# Patient Record
Sex: FEMALE | Race: WHITE | Employment: OTHER | URBAN - METROPOLITAN AREA
[De-identification: names, ages, dates, MRNs, and addresses within clinical notes are randomized per-mention and may not be internally consistent; named-entity substitution may affect disease eponyms.]

---

## 2017-12-01 ENCOUNTER — TRANSFERRED RECORDS (OUTPATIENT)
Dept: HEALTH INFORMATION MANAGEMENT | Facility: CLINIC | Age: 67
End: 2017-12-01

## 2017-12-20 ENCOUNTER — TRANSFERRED RECORDS (OUTPATIENT)
Dept: HEALTH INFORMATION MANAGEMENT | Facility: CLINIC | Age: 67
End: 2017-12-20

## 2017-12-27 ENCOUNTER — TRANSFERRED RECORDS (OUTPATIENT)
Dept: HEALTH INFORMATION MANAGEMENT | Facility: CLINIC | Age: 67
End: 2017-12-27

## 2017-12-28 ENCOUNTER — TRANSFERRED RECORDS (OUTPATIENT)
Dept: HEALTH INFORMATION MANAGEMENT | Facility: CLINIC | Age: 67
End: 2017-12-28

## 2018-01-26 ENCOUNTER — TRANSFERRED RECORDS (OUTPATIENT)
Dept: HEALTH INFORMATION MANAGEMENT | Facility: CLINIC | Age: 68
End: 2018-01-26

## 2018-02-20 ENCOUNTER — TELEPHONE (OUTPATIENT)
Dept: NEUROLOGY | Facility: CLINIC | Age: 68
End: 2018-02-20

## 2018-02-20 NOTE — TELEPHONE ENCOUNTER
Returned call to patient who was requesting ALS Clinic appointment. Patient received dx from [facility] in Dec 2017/Jan 2018. Patient will have [facility] send records and will be contacted to schedule once reviewed.

## 2018-04-05 ENCOUNTER — THERAPY VISIT (OUTPATIENT)
Dept: SPEECH THERAPY | Facility: CLINIC | Age: 68
End: 2018-04-05
Payer: MEDICARE

## 2018-04-05 ENCOUNTER — OFFICE VISIT (OUTPATIENT)
Dept: NEUROLOGY | Facility: CLINIC | Age: 68
End: 2018-04-05
Payer: MEDICARE

## 2018-04-05 ENCOUNTER — RESULTS ONLY (OUTPATIENT)
Dept: LAB | Age: 68
End: 2018-04-05

## 2018-04-05 ENCOUNTER — ALLIED HEALTH/NURSE VISIT (OUTPATIENT)
Dept: NEUROLOGY | Facility: CLINIC | Age: 68
End: 2018-04-05

## 2018-04-05 ENCOUNTER — ALLIED HEALTH/NURSE VISIT (OUTPATIENT)
Dept: NUTRITION | Facility: CLINIC | Age: 68
End: 2018-04-05

## 2018-04-05 ENCOUNTER — THERAPY VISIT (OUTPATIENT)
Dept: OCCUPATIONAL THERAPY | Facility: CLINIC | Age: 68
End: 2018-04-05
Payer: MEDICARE

## 2018-04-05 VITALS
BODY MASS INDEX: 20.28 KG/M2 | SYSTOLIC BLOOD PRESSURE: 159 MMHG | HEIGHT: 61 IN | WEIGHT: 107.4 LBS | DIASTOLIC BLOOD PRESSURE: 79 MMHG | HEART RATE: 80 BPM

## 2018-04-05 DIAGNOSIS — G12.21 ALS (AMYOTROPHIC LATERAL SCLEROSIS) (H): Primary | ICD-10-CM

## 2018-04-05 DIAGNOSIS — G12.21 ALS (AMYOTROPHIC LATERAL SCLEROSIS) (H): ICD-10-CM

## 2018-04-05 DIAGNOSIS — K11.7 DISTURBANCE OF SALIVARY SECRETION: ICD-10-CM

## 2018-04-05 DIAGNOSIS — Z78.9 DECREASED ACTIVITIES OF DAILY LIVING (ADL): ICD-10-CM

## 2018-04-05 DIAGNOSIS — R13.12 OROPHARYNGEAL DYSPHAGIA: Primary | ICD-10-CM

## 2018-04-05 DIAGNOSIS — Z71.9 VISIT FOR COUNSELING: Primary | ICD-10-CM

## 2018-04-05 DIAGNOSIS — R13.12 OROPHARYNGEAL DYSPHAGIA: ICD-10-CM

## 2018-04-05 LAB
ALBUMIN SERPL-MCNC: 4.1 G/DL (ref 3.4–5)
ALP SERPL-CCNC: 53 U/L (ref 40–150)
ALT SERPL W P-5'-P-CCNC: 16 U/L (ref 0–50)
AST SERPL W P-5'-P-CCNC: 22 U/L (ref 0–45)
BILIRUB DIRECT SERPL-MCNC: <0.1 MG/DL (ref 0–0.2)
BILIRUB SERPL-MCNC: 0.3 MG/DL (ref 0.2–1.3)
CK SERPL-CCNC: 251 U/L (ref 30–225)
CREAT SERPL-MCNC: 0.73 MG/DL (ref 0.52–1.04)
GFR SERPL CREATININE-BSD FRML MDRD: 80 ML/MIN/1.7M2
PROT SERPL-MCNC: 7.6 G/DL (ref 6.8–8.8)
URATE SERPL-MCNC: 4.5 MG/DL (ref 2.6–6)

## 2018-04-05 RX ORDER — TRAMADOL HYDROCHLORIDE 50 MG/1
TABLET ORAL DAILY PRN
COMMUNITY
Start: 2018-04-02

## 2018-04-05 RX ORDER — AMLODIPINE BESYLATE 10 MG/1
10 TABLET ORAL DAILY
COMMUNITY
Start: 2017-10-02

## 2018-04-05 RX ORDER — RILUZOLE 50 MG/1
50 TABLET, FILM COATED ORAL EVERY 12 HOURS
Qty: 60 TABLET | Refills: 3 | Status: SHIPPED | OUTPATIENT
Start: 2018-04-05

## 2018-04-05 RX ORDER — IBUPROFEN 200 MG
800 TABLET ORAL EVERY 6 HOURS PRN
COMMUNITY
Start: 2016-01-26

## 2018-04-05 RX ORDER — LISINOPRIL AND HYDROCHLOROTHIAZIDE 20; 25 MG/1; MG/1
20-25 TABLET ORAL DAILY
COMMUNITY
Start: 2017-12-01

## 2018-04-05 RX ORDER — SIMVASTATIN 10 MG
10 TABLET ORAL AT BEDTIME
COMMUNITY
Start: 2017-10-30 | End: 2018-04-05 | Stop reason: SINTOL

## 2018-04-05 RX ORDER — POTASSIUM CHLORIDE 1500 MG/1
20 TABLET, EXTENDED RELEASE ORAL DAILY
COMMUNITY
Start: 2017-11-24

## 2018-04-05 ASSESSMENT — REVISED AMYOTROPHIC LATERAL SCLEROSIS FUNCTIONAL RATING SCALE (ALSFRS-R)
WALKING: 3
ALSFRS_TOTAL_SCORE: 37
DYSPNEA: 3
SALIVATION: MODERATELY EXCESSIVE SALIVA; MAY HAVE MINIMAL DROOLING
WALKING: EARLY AMBULATION DIFFICULTIES
CLIMBING_STAIRS: 3
GROSS_MOTOR_SUBTOTAL_SCORE: 10
DRESSING_AND_HYGEINE: INDEPENDENT AND COMPLETE SELF-CARE WITH EFFORT OR DECREASED EFFICIENCY
CLIMBING_STAIRS: SLOW
TURNING_IN_BED_AND_ADJUSTING_BED_CLOTHES: NORMAL
FINE_MOTOR_SUBTOTAL_SCORE: 9
SALIVATION: 2
DRESSING_AND_HYGEINE: 3
HANDWRITING: SLOW OR SLOPPY: ALL WORDS ARE LEGIBLE
TURNING_IN_BED_AND_ADJUSTING_BED_CLOTHES: 4
RESPIRATORY_SUBTOTAL_SCORE: 11
WALKING: 4
SWALLOWING: EARLY EATING PROBLEMS - OCCASIONAL CHOKING
SWALLOWING: 3
HANDWRITING: 3
WALKING: NORMAL
CUTTING_FOOD_AND_HANDLING_UTENSILES: 3
BULBAR_SUBTOTAL: 7
SPEECH: 2
SPEECH: INTELLIGIBLE WITH REPEATING
DYSPNEA: OCCURS WHEN WALKING
SIX_ITEM_SUBTOTAL: 18
ORTHOPENA: 4
RESPIRATORY_INSUFFICIENCY: 4

## 2018-04-05 ASSESSMENT — PAIN SCALES - GENERAL: PAINLEVEL: NO PAIN (0)

## 2018-04-05 NOTE — MR AVS SNAPSHOT
After Visit Summary   4/5/2018    Gina Stiles    MRN: [id number]           Patient Information     Date Of Birth          1950        Visit Information        Provider Department      4/5/2018 8:00 AM Broderick Funes OT [facility] Occupational Therapy and Rehab        Care Instructions    CHRIS Ahmadi/DAKOTA, [facility]  Occupational Therapy  [facility]  [address], [address]  [place] [address]  Voice mail:[phone]  Fax: [phone]            Follow-ups after your visit        Your next 10 appointments already scheduled     Apr 05, 2018 12:45 PM CDT   LAB with  LAB   [facility] Lab ([facility])    [address] [address] [phone]           Please do not eat 10-12 hours before your appointment if you are coming in fasting for labs on lipids, cholesterol, or glucose (sugar). This does not apply to pregnant women. Water, hot tea and black coffee (with nothing added) are okay. Do not drink other fluids, diet soda or chew gum.            Jul 12, 2018  9:30 AM CDT   PFT VISIT with  PFL C   [facility] Pulmonary Function Testing ([facility])    [address] [address] [phone]            Jul 12, 2018 10:00 AM CDT   (Arrive by 9:45 AM)   New ALS/[place] with Maxime Ny MD   [facility] Neurology ([facility])    [address] [address] [phone]              Future tests that were ordered for you today     Open Future Orders        Priority Expected Expires Ordered    PHYSICAL THERAPY REFERRAL Routine 4/5/2018 4/5/2019 4/5/2018    OCCUPATIONAL THERAPY REFERRAL Routine 4/5/2018 4/5/2019 4/5/2018    SPEECH THERAPY REFERRAL Routine 4/5/2018 4/5/2019 4/5/2018            Who to contact     Please call your clinic at [phone]  to:    Ask questions about your health    Make or cancel appointments    Discuss your medicines    Learn about your test results    Speak to your doctor            Additional Information About Your Visit        eKon[employer]art Information     [employer] is an electronic gateway that provides easy, online access to your medical records. With [employer], you can request a clinic appointment, read your test results, renew a prescription or communicate with your care team.     To sign up for [employer] visit the website at [email or web address]   You will be asked to enter the access code listed below, as well as some personal information. Please follow the directions to create your username and password.     Your access code is: [email or web address]  Expires: 2018 10:56 AM     Your access code will  in 90 days. If you need help or a new code, please contact your [facility] or call [phone] for assistance.        Care EveryWhere ID     This is your Care EveryWhere ID. This could be used by other organizations to access your [facility] medical records  [id number]         Blood Pressure from Last 3 Encounters:   18 159/79   07 146/90   07 144/86    Weight from Last 3 Encounters:   18 48.7 kg (107 lb 6.4 oz)   07 60.3 kg (133 lb)   07 61.2 kg (135 lb)              Today, you had the following     No orders found for display         Today's Medication Changes          These changes are accurate as of 18 12:15 PM.  If you have any questions, ask your nurse or doctor.               Stop taking these medicines if you haven't already. Please contact your care team if you have questions.     simvastatin 10 MG tablet   Commonly known as:  ZOCOR   Stopped by:  Maxime Ny MD                    Primary Care Provider Office Phone # Fax #    Katelyn Anne Lee [phone]       [address] [place] [address]        Equal Access to  Services     [facility]: Hadii aad ku hadrenashahnaz [place], waaxda luqadaha, qaybta kaalmada kayleen, kim lee . So [facility] [phone].    ATENCIÓN: Si habla sofia, tiene a roberts disposición servicios gratuitos de asistencia lingüística. Llame al [phone].    We comply with applicable federal civil rights laws and [place] laws. We do not discriminate on the basis of race, color, national origin, age, disability, sex, sexual orientation, or gender identity.            Thank you!     Thank you for choosing [facility] OCCUPATIONAL THERAPY AND REHAB  for your care. Our goal is always to provide you with excellent care. Hearing back from our patients is one way we can continue to improve our services. Please take a few minutes to complete the written survey that you may receive in the mail after your visit with us. Thank you!             Your Updated Medication List - Protect others around you: Learn how to safely use, store and throw away your medicines at www.disposemymeds.org.          This list is accurate as of 4/5/18 12:15 PM.  Always use your most recent med list.                   Brand Name Dispense Instructions for use Diagnosis    amLODIPine 10 MG tablet    NORVASC     Take 10 mg by mouth daily        ibuprofen 200 MG tablet    ADVIL/MOTRIN     Take 800 mg by mouth every 6 hours as needed        lisinopril-hydrochlorothiazide 20-25 MG per tablet    PRINZIDE/ZESTORETIC     Take 20-25 tablets by mouth daily        potassium chloride SA 20 MEQ CR tablet    K-DUR/KLOR-CON M     Take 20 mEq by mouth daily        traMADol 50 MG tablet    ULTRAM     Take by mouth daily as needed

## 2018-04-05 NOTE — PROGRESS NOTES
Outpatient Speech Language Pathology Neurology Clinic Evaluation  Gina Stiles YOB: 1950 [id number]    Visit Date: 4/5/2018    Age: 68 year old    Medical Diagnosis: Amyotrophic lateral sclerosis (ALS)    Date of Diagnosis: 01/2018 at [facility]    Referring MD: Dr Maxime Ny    PMH: Refer to Medical Chart    Fall Risk:Refer to physician report.    Others at Clinic Visit:  Children, daughter Rubi    Living Situation:   With others    Patient Concerns/Goals:  Increased swallowing difficulty  Increased speech deficits  Augmentative / Alternative communication needs    Observations: Patient seen for the first time in this clinic today to establish ongoing care of newly diagnosed bulbar onset ALS which was diagnosed at [facility] 01/2018.    Current Mode of Nutrition:   Oral diet of regular solids and thin liquids  Takes pills whole in applesauce    Weight: 107lbs, 25 pound weight loss    Cardio-Respiratory Status: Forced vital capacity: 85%    Functional Rating Scale (ALS-FRS): 37/48    EAT10  Eating Assessment Tool  Score 21/40  3  My swallowing problem has caused me to lose weight  4  My swallowing problem interferes with my ability to go out for meals  3  Swallowing liquids takes extra effort  2  Swallowing solids takes extra effort  3  Swallowing pills takes extra effort  0  Swallowing is painful  0  The pleasure of eating is affected by my swallowing  2  When I swallow food sticks in my throat  2  I cough when I eat  2  Swallowing is stressful    Oral Motor/Swallowing  Oral Motor Function:  Anomalies present:    Labial anomaly- mild to moderate weakness with slow effortful movement  Lingual anomaly- moderate to severe weakness, slow effortful movement  Tongue fasciculation present    Volitional Abilities:  Cough- Weak  Throat Clear- Functional  Swallow- Present    Feeding Assistance: No assistance needed    Swallow Function:   Thin Liquid-  Moderately poor bolus control and reduced A-P propulsion  with delayed swallow initiation and reduced laryngeal elevation. Throat cleared noted immediately after intake, patient reports this is strategy which was taught to her to clear residue.  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 Mildly delayed swallow initiation and mild to moderately reduced laryngeal elevation.    Dysphagia Diagnosis: Moderate dysphagia more significant with liquids than solids. Patient verbalizes understanding of aspiration risks and would like to continue thin liquids despite understanding of risks. VFSS completed at [facility] 12/27/17 with radiologist report noting penetration to the vocal cords with thin and thick liquids, chin tuck maneuver aided in clearance. Regular solids tolerated without aspiration but pharyngeal residue reported. Will request SLP notes from this study and then determine if repeat study is indicated given her high risk for aspiration.     Speech Intelligibility/Functional Communication   Methods of communication: Verbal    Dysarthria: Mild-moderate    Speech:   Deficits in phonation- Dysphonia, Strained-strangled quality (spastic) and monopitch/monoloudness  Deficits in articulation- Decreased precision of articulation and Slow effortful rate. [facility] SLP 12/27/18 found speaking rate of 96 words per minutes, significantly less than normal range. Appears similar today but not formally assessed.  Deficits in resonance- Hypernasal  Intelligibility- 100% with her independent use of strategies in quiet environment    Speech Intelligibility/Communication:  Impacts social activities and Impacts phone usage    Augmentative and Alternative Communication (AAC):   Detailed education re Voice Banking process, referral made to [facility]   Brief introductory  information re low tech as well as high tech AAC options to be used in future if/when needed    Clinical Impression/Plan of Care  Treatment Diagnosis: Oropharyngeal Dysphagia and Dysarthia     Recommendations:  Oral diet.  Continue use of compensatory strategies.  Strongly consider PEG placement:  As soon as possible.  Continue compensatory speech strategies.  Voice Banking.    Plan of Care:  Evaluation only.    Goals: Based on today's evaluation session patient and/or caregiver will have understanding of current communication and/or swallowing status and recommendations for management.    Educational Assessment:  Learners- Patient and Children  Barriers to Learning- No barriers.    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 SLP also educated re diet modification to improve safety but she chooses to continue intake of thin liquids and regular solids. EAT10 score of 21 suggests dysphagia is severely impacting her daily life and puts her at very high risk for aspiration as well, SLP educated her re outcomes. She appears accepting of enteral feeding and would recommend to supplement PO intake. SLP will also seek report of recent VFSS at [facility] and consider repeat study pending results and patient decision re enteral feeding. SLP educated re benefit of enteral feeding to allow PO intake for pleasure only as well as to reduce mealtime fatigue. SLP educated re ongoing use of swallowing strategies including slow rate, small amounts, chin tuck, and throat clear after intake to clear pharyngeal residue although also educated re possible side effects of long term consistent throat clearing on function of vocal  cords.   Communication intervention, 7 minutes  SLP educated re speech strategies including reducing background noise, face-to-face communication whenever possible, overarticulation, use of nonverbal communication to supplement, etc. Handouts provided. SLP also educated re: voice banking as well as introductory information re AAC options.    Response to recommendations/treatment: verbalized understanding, ask appropriate questions, and agreed to POC/recommendations    Goal attainment: All goals met    Risks and benefits of evaluation/treatment have been explained.  Patient, family and/or caregiver are in agreement with Plan of Care.    Timed Code Treatment Minutes: 0 minutes timed    Total Treatment Time (sum of timed and untimed services): 48 minutes    Medicare G-code:  Swallowing  Swallowing: Current Status , Goal , Discharge   1 session only, modifier the same for all G-codes.  CJ: 20-39% impairment  Modifier determined by clinical judgment in conjunction with objective data and subjective report.    Certification:  Onset date: 4/5/18  Start of care date: 4/5/2018  Certification date from 4/5/2018 to 4/5/18    I CERTIFY THE NEED FOR THESE SERVICES FURNISHED UNDER        THIS PLAN OF TREATMENT AND WHILE UNDER MY CARE     (Physician attestation of this document indicates review and certification of the therapy plan).

## 2018-04-05 NOTE — PROGRESS NOTES
"    OUTPATIENT OCCUPATIONAL THERAPY CLINIC NOTE  Gina Stiles  YOB: 1950  [id number]    Type of visit:  Evaluation            Date of service: 4/5/2018    Referring provider: Dr. Maxime Ny    Others present at visit:  Child(nika), daughter, Kiera    Medical diagnosis:   Amyotrophic lateral sclerosis (ALS) -bulbar onset   Date of diagnosis: 4/5/18     Pertinent medical history:  Onset of dysarthria 2 years and dysphagia Aug 2017 and some hand weakness changes since summer 2017;     Additional Occupational Profile Information (patterns of daily living, interests, values and needs): Pt is a nurse by background.    Cardio-respiratory status:  Forced vital capacity: 85 % of predicted     Height/Weight: 5' 1\" / 107.4#    Living environment:  House with dtrKiera    Living environment barriers:  4 stairs to enter (1 railing present) rambler    tub/shower built in grab bar-plastic   Regular height toilet  Current assistance/living environment:  Lives with dtr      Current mobility equipment:  None    Current ADL equipment:  None     Technology used: cell phone    Patient concerns/goals: hand writing slow and sloppy and cutting food more difficult; slow going upstairs due to dyspnea, buttoning and tying shoes somewhat harder, notes cutting food is harder and cutting her fingernails has to be done by her daughter    Evaluation   Interview completed.   Pain assessment:  Pain denied    Range of motion: UE: AROM is WFL with exception of opposition on L hand reduced to 3rd through 5th digit and palmar pinch is hard to achieve R handed    Manual muscle testing: UE: 4+/5 at shoulder-flexion and bicep/tricep; some sustained fatigue overhead per pt with ADL/IADL;  is strong with manual test and R > L; lateral pinch is fair R > L and palmar pinch is weak and L is weaker than R; wasting in thumb webspace aracelis    Cognition: ALS CBS (ALS Cognitive Behavioral Screen) completed today:    Total Cognitive score " (assessed by clinician): 17/20    *Cognitive scores ranging from 17-20 do not support the presence of clear cognitive impairment  *Scores below 16 raise suspicion of cognitive impairment with this suspicion increasing significantly for scores falling below 12 suggesting need for further evaluation  *Scores below 10 raise considerable suspicion for ALS-FTD or other dementia and suggests need for comprehensive evaluation    Cognitive screening comments/detail: see ALS smartforms    Total Behavioral Score (completed by patient's caregiver):  36/45   ALS Caregiver Behavioral Questionnaire, completed by dtr    *Behavioral score of 36 or less suspicious of behavioral impairment that may be endorsed on comprehensive evaluation    *Behavioral score equal to or less than 32 suspicious of FTD (Frontotemporal dementia) and suggests need for comprehensive evaluation    Behavioral screening comments/details: See ALS smartforms      ADL/IADL:  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 No      Has the patient fallen and had an injury in the past year? No       Timed Up and Go Score: NT, pt denies any changes in leg strength or balance    Is the patient a fall risk? No     Impairments:  Fatigue  Muscle atrophy  Coordination  Range of motion     Treatment diagnosis:  Impaired activities of daily living    Assessment of Occupational Performance: 3-5 Performance Deficits  Identified Performance Deficits (ie: feeding, social skills): hand writing slow and sloppy and cutting food more difficult; slow going upstairs due to dyspnea, buttoning and tying shoes somewhat harder, notes cutting food is harder and cutting her fingernails has to be done by her daughter  Clinical Decision Making (Complexity): Low  complexity     Recommendations/Plan of care:  Patient would benefit from interventions to enhance safety and independence.  Rehab potential good for stated goals.  Occupational therapy intervention for  self care/home management.  1 session evaluation & treatment.  Recommend referral to hand therapy in [facility].    Goals:   Target date: today  Patient, family and/or caregiver will verbalize understanding of evaluation results and implications for functional performance.  Patient, family and/or caregiver will verbalize/demonstrate understanding of compensatory methods /equipment to enhance functional independence and safety.  Patient, family and/or caregiver will verbalize/demonstrate understanding of positioning techniques/equipment.      Educational assessment/barriers to learning:  No barriers noted      Treatment provided this date:   Self care/home management, 15 minutes of training in:  Adaptations and AE for weakness in L > R hands:  Button hook, elastic laces, nail clipper board, non-slip material, use of needle nose plier, rocker knife for cutting food, hand therapy for custom fitting of bilateral hand based thumb splints to aid palmar pinch force for ADL/IADL-requested all equipment with exception of splint from [facility] today and recommended hand therapy orders be sent with pt and pt will ask her primary MD in [facility] for a hand therapy clinic to see     Response to treatment/recommendations: very receptive    Goal attainment:  All goals met    Risks and benefits of evaluation/treatment have been explained.  Patient, family and/or caregiver are in agreement with Plan of Care.     Timed Code Treatment Minutes: 15  Total Treatment Time (sum of timed and untimed services): 30    Signature: ROMEO Ahmadi MSCS   Date: 4/5/2018     Medicare G-code:  Self Care  Current Status , Goal ,  Discharge   1 session only, modifier the same for all G-codes.  CI: 1-19% impairment  Modifier  determined by clinical judgment in conjunction with objective data and subjective report.    Certification:  Onset date: 4/5/18  Start of care date: 4/5/2018  Certification date from 4/5/2018 to 4/5/18    I CERTIFY THE NEED FOR THESE SERVICES FURNISHED UNDER        THIS PLAN OF TREATMENT AND WHILE UNDER MY CARE     (Physician attestation of this document indicates review and certification of the therapy plan).

## 2018-04-05 NOTE — PROGRESS NOTES
"CLINICAL NUTRITION SERVICES - ASSESSMENT NOTE    Gina Stiles 68 year old referred for MNT related to ALS    Visit Type: Initial  Referring Physician: Yanely  Pt accompanied by: her daughter, Rubi    NUTRITION HISTORY  Factors affecting nutrition intake include:dysphagia  Current diet: soft foods  Current appetite/intake: fair  PEG Tube: Considering feeding tube.      Gina denies dysphagia.  She reports that her intake has been lower 2/2 fatigue with eating and recently moving, skipping meals.    She started drinking Boost, 1/day.  She recently bought a Bullet  and plans to make high calorie smoothies.    She is interested in a feeding tube to help supplement her PO intake.    Per SLP, her swallow is altered, however, deemed her safe to consume a regular diet with thin liquids.     Diet Recall  Breakfast Toast with butter and peanut butter, +/- eggs (over easy)   Lunch [place] with turkey or ham or leftovers from dinner   Dinner Meat (chicken, fish, ground beef, turkey), baked potatoes, steamed vegetables   Snacks Fruit/vegetable smoothies   Beverages Water, Boost (recently started drinking)     ANTHROPOMETRICS  Height: 61\"  Weight: 107 lbs/48kg  BMI: 20.2  Weight Status:  Normal BMI  IBW: 105 lbs  % IBW: 102%  Weight History: down 26 lbs x past 6 months (20%)  Wt Readings from Last 6 Encounters:   04/05/18 48.7 kg (107 lb 6.4 oz)   09/21/07 60.3 kg (133 lb)   09/18/07 61.2 kg (135 lb)   08/14/07 61.7 kg (136 lb 1.9 oz)   06/18/07 62.6 kg (138 lb 1.3 oz)   06/14/07 61.2 kg (135 lb)       ALS FRS-6 (Modification of H-B equations for ALS)   BMR = 1094 Kcal/day   Total ALS FRS 6 score =  18   Kcal needs per ALS- FRS = 1933kcal/day       Medications/vitamins/minerals/herbals:   Reviewed    LABS  Labs reviewed    ASSESSED NUTRITION NEEDS:  Estimated Energy Needs: 6968-8460 kcals (30-35 Kcal/Kg)  Justification: repletion  Estimated Protein Needs: 60 grams protein (1.2 g pro/Kg)  Justification: " maintenance  Estimated Fluid Needs: 2000  mL   Justification: maintenance    MALNUTRITION:  % Weight Loss:  > 10% in 6 months (severe malnutrition)  % Intake:  <75% for >/= 3 months (non-severe malnutrition)  Subcutaneous Fat Loss:  Orbital region mild depletion  Muscle Loss:  Temporal region mild depletion  Fluid Retention:  None noted    Malnutrition Diagnosis: Non-Severe malnutrition  In Context of:  Chronic illness or disease    NUTRITION DIAGNOSIS:  Inadequate oral intake related to dysphagia, meal time fatigue as evidenced by 20% wt loss x past 6 months    INTERVENTIONS  Nutrition Education     Provided written & verbal education on:   - Ways to maximize kcal and protein intake. Discussed calorie and protein needs for maintenance of weight and nutrition status.  Advised pt to aim for at least 1900kcal and 60g protein via 5-6 small frequent meals.  Discussed that as ALS progresses, eating may become more difficult and discussed ways to cope with this. Encouraged pt to focus on soft foods to help reduce meal time fatigue.   - Discussed potential need for G-tube.  Described placement surgery and how it is used. Discussed formula and administration methods (gravity and bolus).  Discussed that this would be able to provide full nutrition prn.  Demonstrated DANYEL-KEY feeding tube model.   - ONS (Ensure Enlive, Ensure Plus/Boost Plus, CIB, Benecalorie, Scandi shake etc). Suggested ways to incorporate these supplements to avoid flavor fatigue. Encouraged pt to start consuming 2 ONS daily.       Provided pt with corresponding education materials/handouts on:  High Calorie, High Protein Recipe booklet, Tips to Increase the Calories in Your Diet    Pt verbalize understanding of materials provided during consult.   Patient Understanding: Good  Expected Compliance: Good     Goals  1.  Aim for 5-6 small frequent meals  2.  Aim for 1900kcal (per FRS) and 60g protein/day  3. Weight maintenance      Follow-Up Plans: Pt has RD  contact information for questions.    Pt encouraged to follow up with RD at next clinic visit in 3-6 months.    MONITORING AND EVALUATION:  -Food intake, G tube placement  -Fluid/beverage intake  -Liquid meal replacement or supplement   -Weight trends    Loren Magaña RD, LD

## 2018-04-05 NOTE — LETTER
2018       RE: Gina Stiles  [address]  [facility] [address]     Dear Colleague,    Thank you for referring your patient, Gina Stiles, to the [facility] NEUROLOGY at [facility]. Please see a copy of my visit note below.    [id number]    Service Date: 2018      Karin Bray PA-C   [address] [place] [address]      RE: Gina Stiles    MRN: [id number]   : 1950      Dear Ms. Bray:      I saw Gina Stiles today as a new patient referral at the [facility] where she has been referred with a diagnosis of ALS.  Mrs. Stiles is a 68-year-old woman who first noticed a change in her speech in 2016.  It has progressively worsened since then and she has also subsequently developed a reduction in dexterity in the hands, and occasional cramping and muscle twitching in the legs since last fall.  She was recently diagnosed with ALS at the [facility] and is referred here for ongoing management.      Ms. Stiles's relevant system review is notable for a 25-pound weight loss with mealtime fatigue and some coughing after meals.  She has reduced cough effectiveness.  She endorses some sialorrhea.  She denies positive or negative sensory symptoms and denies cognitive or behavioral change.  She neither smokes nor uses alcohol.  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  She is not working currently.      FAMILY HISTORY:  She has 4 children and no siblings.  Her mother had 2 siblings and her father 3.  There is no family history of neurodegenerative or neuromuscular disease.      The patient denies orthopnea or difficulty with breathing.      The patient's past medical history is notable for hyperlipidemia and hypertension which are controlled.  Her medications are documented in the electronic medical record.      EXAMINATION:   The patient is a thin but otherwise healthy-appearing woman.  Vital signs are normal.  Neurologic examination demonstrates the following:  She is alert and cooperative.  There is a moderately severe spastic dysarthria, but speech is nearly fully comprehensible.  Language is intact.  Thought content is normal and she is oriented and reports her history accurately.  Pupils are equal, round and reactive to light.  Extraocular movements are full.  There is neither pathologic nystagmus nor ptosis.  Orbicularis oculi strength is full, but orbicularis oris is impaired to a moderately severe degree.  The palate elevates upon phonation.  Tongue bulk is within broad normal limits but fasciculations are evident and tongue moves slowly and with moderately severe weakness.  Pterygoid, neck flexion, neck extension and sternocleidomastoid strength are all within broad normal limits.  Sensory examination reveals timed vibration of 5-8 seconds at the toes with preserved perception of touch and pin.  Motor examination demonstrates moderately severe symmetric atrophy of intrinsic hand muscles.  Tone is normal or perhaps slightly reduced at the left ankle.  Manual muscle testing demonstrates full strength except for FDI, 4-/3, and APB, 3/2.  Reflexes are 2+ at the jaw, absent in the face, 3+ in the upper limbs with spread to finger flexors from brachioradialis, negative Malik's bilaterally, 3+ at the patellae with vertical spread from left to right, and 2+ at the Achilles without clonus.  Plantar responses are bilaterally upgoing.  She walks independently.  She walks on toes without difficulty but does have difficulty walking on heels.  She has a negative Romberg sign.      Electrodiagnostic studies and brain MRI were reviewed.      I met with Gina and her daughter for greater than 60 minutes.  I indicated the following:  I concur with the diagnosis of ALS.  I discussed the diagnostic process and the natural history of ALS.  I  recommended riluzole and edaravone.  We will start her riluzole today if her hepatic panel is normal.  We will enter in a prescription for edaravone to be sent to insurance for approval.  I explained the importance of ventilatory management.  Her forced vital capacity is normal, but her MIP and MEP are both low.  We will start her on noninvasive ventilation at night and we have taught her lung volume recruitment to improve or maintain cough effectiveness.      With regard to her swallowing, after consultation with our speech language pathologist and dietitian, we all agreed that Mrs. Nava would benefit from relatively early gastrostomy.  She is interested in proceeding with this.  Potential benefit and risks have been discussed.  Weight loss needs to be stemmed if possible.  With respect to sialorrhea, I will start her on Robinul, which she can use as needed.  I explained that there are second and third line options should this not be sufficient.      Mrs. Nava reports that she would not want mechanical ventilation to maintain life at end-stage ALS.  I have encouraged her to complete an advanced directive.      Mrs. Nava met with all members of our multidisciplinary care team today including our research coordinator.  She will follow up in about 2 months.         D: 2018   T: 2018   MT: ELLIOT      Name:     DERRICK NAVA   MRN:      [id number]        Account:      [id number]   :      1950           Service Date: 2018      Document: [id number]        Again, thank you for allowing me to participate in the care of your patient.      Sincerely,    Maxime Ny MD

## 2018-04-05 NOTE — MR AVS SNAPSHOT
After Visit Summary   4/5/2018    Gina Stiles    MRN: [id number]           Patient Information     Date Of Birth          1950        Visit Information        Provider Department      4/5/2018 8:00 AM Maxime Ny MD [facility] Neurology        Today's Diagnoses     ALS (amyotrophic lateral sclerosis) (H)    -  1    Oropharyngeal dysphagia          Care Instructions    You will be prescribed Robinul for secretions and Riluzole, an ALS medication. Pharmacy preference?    You will need 3 liver panels done over the next 3 mos, beginning 30 days after starting Riluzole, then in another 30 days, then in another 30 days-3x in approx 90 days. Lab orders can be seen by any [facility].    You will be contacted by the medical supply company about setting up your BiPAP.          Follow-ups after your visit        Additional Services     OCCUPATIONAL THERAPY REFERRAL       OT Clinician to evaluate and treat patient in ALS Clinic.            PHYSICAL THERAPY REFERRAL       PT Clinician to evaluate and treat patient in ALS Clinic.            SPEECH THERAPY REFERRAL       Speech Language Pathologist to evaluate and treat patient in ALS Clinic.            OCCUPATIONAL THERAPY REFERRAL       *This therapy referral will be filtered to a centralized scheduling office at [facility] and the patient will receive a call to schedule an appointment at a [facility] location most convenient for them. *     [facility] provides Occupational Therapy evaluation and treatment and many specialty services across the [facility] system.  If requesting a specialty program, please choose from the list below.    If you have not heard from the scheduling office within 2 business days, please call [phone] for all locations, with the exception of [place], please call [phone] and [facility], please call [phone]    Treatment: Evaluation & Treatment  Special  "Instructions/Modalities:   Hand therapy in [place] for bilateral thumb splints, hand based for improving palmar pinch force due to weakness from ALS    Special Programs: Hand Therapy    Please be aware that coverage of these services is subject to the terms and limitations of your health insurance plan.  Call member services at your health plan with any benefit or coverage questions.      **Note to Provider:  If you are referring outside of [facility] for the therapy appointment, please list the name of the location in the \"special instructions\" above, print the referral and give to the patient to schedule the appointment.                  Your next 10 appointments already scheduled     Apr 05, 2018 12:45 PM CDT   LAB with  LAB   [facility] ([facility])    [address] [address] [phone]           Please do not eat 10-12 hours before your appointment if you are coming in fasting for labs on lipids, cholesterol, or glucose (sugar). This does not apply to pregnant women. Water, hot tea and black coffee (with nothing added) are okay. Do not drink other fluids, diet soda or chew gum.            Jul 12, 2018  9:30 AM CDT   PFT VISIT with  PFL C   [facility] Pulmonary Function Testing ([facility])    [address] [address] [phone]            Jul 12, 2018 10:00 AM CDT   (Arrive by 9:45 AM)   New ALS/Motor Neuron with Maxime Ny MD   [facility] Neurology ([facility])    [address] [id number] [phone]              Future tests that were ordered for you today     Open Future Orders        Priority Expected Expires Ordered    OCCUPATIONAL THERAPY REFERRAL Routine 4/7/2018 4/5/2019 4/5/2018    Hepatic panel Routine  4/5/2019 4/5/2018    PHYSICAL THERAPY REFERRAL Routine 4/5/2018 4/5/2019 4/5/2018    OCCUPATIONAL THERAPY " "REFERRAL Routine 2018    SPEECH THERAPY REFERRAL Routine 2018            Who to contact     Please call your clinic at [phone] to:    Ask questions about your health    Make or cancel appointments    Discuss your medicines    Learn about your test results    Speak to your doctor            Additional Information About Your Visit        MyChart Information     friendfund is an electronic gateway that provides easy, online access to your medical records. With friendfund, you can request a clinic appointment, read your test results, renew a prescription or communicate with your care team.     To sign up for friendfund visit the website at [email or web address]   You will be asked to enter the access code listed below, as well as some personal information. Please follow the directions to create your username and password.     Your access code is: [email or web address]  Expires: 2018 10:56 AM     Your access code will  in 90 days. If you need help or a new code, please contact your [facility] or call [phone] for assistance.        Care EveryWhere ID     This is your Care EveryWhere ID. This could be used by other organizations to access your [facility] medical records  [id number]        Your Vitals Were     Pulse Height BMI (Body Mass Index)             80 1.549 m (5' 1\") 20.29 kg/m2          Blood Pressure from Last 3 Encounters:   18 159/79   07 146/90   07 144/86    Weight from Last 3 Encounters:   18 48.7 kg (107 lb 6.4 oz)   07 60.3 kg (133 lb)   07 61.2 kg (135 lb)                 Today's Medication Changes          These changes are accurate as of 18 12:33 PM.  If you have any questions, ask your nurse or doctor.               Stop taking these medicines if you haven't already. Please contact your care team if you have questions.     simvastatin 10 MG tablet   Commonly known as:  ZOCOR "   Stopped by:  Maxime Ny MD                    Primary Care Provider Office Phone # Fax #    Katelyn Anne Lee [phone]       [address] [place] [address]        Equal Access to Services     ROQUEBALTAZAR DODIE : Hadii eloy ku leilanio Soomaali, waaxda luqadaha, qaybta kaalmada adeegyada, waxjayson londonon bessy ng lalaneysteve gaston. So [facility] [phone].    ATENCIÓN: Si habla español, tiene a roberts disposición servicios gratuitos de asistencia lingüística. Llame al [phone].    We comply with applicable federal civil rights laws and [place] laws. We do not discriminate on the basis of race, color, national origin, age, disability, sex, sexual orientation, or gender identity.            Thank you!     Thank you for choosing [facility] NEUROLOGY  for your care. Our goal is always to provide you with excellent care. Hearing back from our patients is one way we can continue to improve our services. Please take a few minutes to complete the written survey that you may receive in the mail after your visit with us. Thank you!             Your Updated Medication List - Protect others around you: Learn how to safely use, store and throw away your medicines at www.disposemymeds.org.          This list is accurate as of 4/5/18 12:33 PM.  Always use your most recent med list.                   Brand Name Dispense Instructions for use Diagnosis    amLODIPine 10 MG tablet    NORVASC     Take 10 mg by mouth daily        ibuprofen 200 MG tablet    ADVIL/MOTRIN     Take 800 mg by mouth every 6 hours as needed        lisinopril-hydrochlorothiazide 20-25 MG per tablet    PRINZIDE/ZESTORETIC     Take 20-25 tablets by mouth daily        potassium chloride SA 20 MEQ CR tablet    K-DUR/KLOR-CON M     Take 20 mEq by mouth daily        traMADol 50 MG tablet    ULTRAM     Take by mouth daily as needed

## 2018-04-05 NOTE — MR AVS SNAPSHOT
After Visit Summary   4/5/2018    Gina Stiles    MRN: [id number]           Patient Information     Date Of Birth          1950        Visit Information        Provider Department      4/5/2018 1:36 PM Betsy Danielson LICSW [facility] Neurology        Today's Diagnoses     Visit for counseling    -  1       Follow-ups after your visit        Your next 10 appointments already scheduled     Jul 12, 2018  9:30 AM CDT   PFT VISIT with TANIKA PFL C   [facility] Pulmonary Function Testing ([facility])    [address] [place] [id number] [phone]            Jul 12, 2018 10:00 AM CDT   (Arrive by 9:45 AM)   New ALS/Motor Neuron with Maxime Ny MD   [facility] Neurology ([facility])    [address] [place] [id number] [phone]              Future tests that were ordered for you today     Open Standing Orders        Priority Remaining Interval Expires Ordered    Hepatic panel Routine 3/3 30 days 4/5/2019 4/5/2018          Open Future Orders        Priority Expected Expires Ordered    OCCUPATIONAL THERAPY REFERRAL Routine 4/7/2018 4/5/2019 4/5/2018    PHYSICAL THERAPY REFERRAL Routine 4/5/2018 4/5/2019 4/5/2018    OCCUPATIONAL THERAPY REFERRAL Routine 4/5/2018 4/5/2019 4/5/2018    SPEECH THERAPY REFERRAL Routine 4/5/2018 4/5/2019 4/5/2018            Who to contact     Please call your clinic at [phone] to:    Ask questions about your health    Make or cancel appointments    Discuss your medicines    Learn about your test results    Speak to your doctor            Additional Information About Your Visit        Dropost.it Information     [employer] is an electronic gateway that provides easy, online access to your medical records. With [email or web address], you can request a clinic appointment, read your test results, renew a prescription or communicate with your care team.     To sign up for [email or web address] visit the  website at [email or web address]   You will be asked to enter the access code listed below, as well as some personal information. Please follow the directions to create your username and password.     Your access code is: [email or web address]  Expires: 2018 10:56 AM     Your access code will  in 90 days. If you need help or a new code, please contact your [facility] or call [phone] for assistance.        Care EveryWhere ID     This is your Care EveryWhere ID. This could be used by other organizations to access your [facility] medical records  [id number]         Blood Pressure from Last 3 Encounters:   18 159/79   07 146/90   07 144/86    Weight from Last 3 Encounters:   18 48.7 kg (107 lb 6.4 oz)   07 60.3 kg (133 lb)   07 61.2 kg (135 lb)              Today, you had the following     No orders found for display         Today's Medication Changes          These changes are accurate as of 18  2:15 PM.  If you have any questions, ask your nurse or doctor.               Stop taking these medicines if you haven't already. Please contact your care team if you have questions.     simvastatin 10 MG tablet   Commonly known as:  ZOCOR   Stopped by:  Maxime Ny MD                    Primary Care Provider Office Phone # Fax #    Katelyn Anne Lee [phone]       [address] [place] [address]        Equal Access to Services     [facility] AH: Hadii aad ku hadasho Soomaali, waaxda luqadaha, qaybta kaalmada adeegyada, kim feldman. So [facility] [phone].    ATENCIÓN: Si habla español, tiene a roberts disposición servicios gratuitos de asistencia lingüística. Llame al [phone].    We comply with applicable federal civil rights laws and [place] laws. We do not discriminate on the basis of race, color, national origin, age, disability, sex, sexual orientation, or gender  identity.            Thank you!     Thank you for choosing [facility] NEUROLOGY  for your care. Our goal is always to provide you with excellent care. Hearing back from our patients is one way we can continue to improve our services. Please take a few minutes to complete the written survey that you may receive in the mail after your visit with us. Thank you!             Your Updated Medication List - Protect others around you: Learn how to safely use, store and throw away your medicines at www.disposemymeds.org.          This list is accurate as of 4/5/18  2:15 PM.  Always use your most recent med list.                   Brand Name Dispense Instructions for use Diagnosis    amLODIPine 10 MG tablet    NORVASC     Take 10 mg by mouth daily        ibuprofen 200 MG tablet    ADVIL/MOTRIN     Take 800 mg by mouth every 6 hours as needed        lisinopril-hydrochlorothiazide 20-25 MG per tablet    PRINZIDE/ZESTORETIC     Take 20-25 tablets by mouth daily        potassium chloride SA 20 MEQ CR tablet    K-DUR/KLOR-CON M     Take 20 mEq by mouth daily        traMADol 50 MG tablet    ULTRAM     Take by mouth daily as needed

## 2018-04-05 NOTE — PROGRESS NOTES
".  ALS Social Work Assessment  Collaborated with: Gina dtfernando Loyd, Dr Ny and members of the ALS interdisciplinary team  Support System: adult children, Mckenzie, Rach, Randy Davalos all in [place]. 11 grand children and many good friends  Living Situation: She was living alone in an apt but she is now living with her dtr Rach who plans to care for her thru the end of her life. Rach's house is one level with no stairs to enter.  Functional Capacity: independent. Pt with dysathria and had to stop working as a  in Feb. Due to her speech  Primary Caregiver: Rach (has CMA training)  Employment status: retired. Receiving   Financial stability/insurance: low income. Is eligible for MA when I reviewed criteria. Provided an MA application for LTC as we discussed services from the elderly waiver that she would be eligible to receive at home with her dtr as a potential paid caregiver. Also has Medicare  Agencies involved: ALSA enrolled  Cultural/ethnic affiliation: Is a spiritual person, no Pentecostalism  Mental Health/CD/Cognitive concerns: none identified, no concerns  Coping style/adjustment to ALS: accepting. \"I\"m settled in my soul, at peace\" Trying to live each day to it's fullest.  Legal concerns: none identified  Advanced Care Planning: Discussed briefly and provided a copy of a health care directive. She wants to fill it out.  Goals/Strengths: Good family support, identified caregiver, positive relationship with dtr Rach was observed.   Resource Needs: medical assistance and Elderly Waiver services in the future  Education Provided: re MA, EW, HCD  Intervention/Assessment: pt making plans for the future and trying to live in the moment. She has good support system. Feeling settled with her situation.  Plan: Assist with referral to Elderly Waiver program when appropriate.  Continue to see at future clinic visits. Pt/dtr aware they can contact me between visits as needed.    Betsy Danielson, MSW, " [facility]    [facility]  [phone]

## 2018-04-05 NOTE — PATIENT INSTRUCTIONS
You will be prescribed Robinul for secretions and Riluzole, an ALS medication. Pharmacy preference?    You will need 3 liver panels done over the next 3 mos, beginning 30 days after starting Riluzole, then in another 30 days, then in another 30 days-3x in approx 90 days. Lab orders can be seen by any [facility].    You will be contacted by the medical supply company about setting up your BiPAP.

## 2018-04-05 NOTE — PATIENT INSTRUCTIONS
CHRIS Ahmadi/DAKOTA, MSCS  Occupational Therapy  [facility]  [address], [address]  [place] [address]  Voice mail:[phone]  Fax: [phone]

## 2018-04-05 NOTE — MR AVS SNAPSHOT
"              After Visit Summary   4/5/2018    Gina Stiles    MRN: [id number]           Patient Information     Date Of Birth          1950        Visit Information        Provider Department      4/5/2018 10:44 AM Loren Jacob RD [facility], [place], Nutrition Services        Today's Diagnoses     ALS (amyotrophic lateral sclerosis) (H)    -  1       Follow-ups after your visit        Future tests that were ordered for you today     Open Future Orders        Priority Expected Expires Ordered    PHYSICAL THERAPY REFERRAL Routine 4/5/2018 4/5/2019 4/5/2018    OCCUPATIONAL THERAPY REFERRAL Routine 4/5/2018 4/5/2019 4/5/2018    SPEECH THERAPY REFERRAL Routine 4/5/2018 4/5/2019 4/5/2018            Who to contact     If you have questions or need follow up information about today's clinic visit or your schedule please contact [facility], [place], NUTRITION SERVICES directly at No information on file..  Normal or non-critical lab and imaging results will be communicated to you by MyChart, letter or phone within 4 business days after the clinic has received the results. If you do not hear from us within 7 days, please contact the clinic through FatTailhart or phone. If you have a critical or abnormal lab result, we will notify you by phone as soon as possible.  Submit refill requests through APImetrics or call your pharmacy and they will forward the refill request to us. Please allow 3 business days for your refill to be completed.          Additional Information About Your Visit        FatTailhart Information     APImetrics lets you send messages to your doctor, view your test results, renew your prescriptions, schedule appointments and more. To sign up, go to [email or web address] . Click on \"Log in\" on the left side of the screen, which will take you to the Welcome page. Then click on \"Sign up Now\" on the right side of the page.     You will be asked to enter the access code listed below, as well as some " personal information. Please follow the directions to create your username and password.     Your access code is: [email or web address]  Expires: 2018 10:56 AM     Your access code will  in 90 days. If you need help or a new code, please call your [place] clinic or [phone].        Care EveryWhere ID     This is your Care EveryWhere ID. This could be used by other organizations to access your [place] medical records  [id number]         Blood Pressure from Last 3 Encounters:   18 159/79   07 146/90   07 144/86    Weight from Last 3 Encounters:   18 48.7 kg (107 lb 6.4 oz)   07 60.3 kg (133 lb)   07 61.2 kg (135 lb)              Today, you had the following     No orders found for display         Today's Medication Changes          These changes are accurate as of 18 11:46 AM.  If you have any questions, ask your nurse or doctor.               Stop taking these medicines if you haven't already. Please contact your care team if you have questions.     simvastatin 10 MG tablet   Commonly known as:  ZOCOR   Stopped by:  Maxime Ny MD                    Primary Care Provider Office Phone # Fax #    Katelyn Anne MD Rosa [phone] [address] [place] [address]        Equal Access to Services     [facility] AH: Hadii eloy Montgomery, waaxda lubasiladaha, qaybta kaalkim bourgeois . So [facility] [phone].    ATENCIÓN: Si habla español, tiene a roberts disposición servicios gratuitos de asistencia lingüística. Llame al [phone].    We comply with applicable federal civil rights laws and [place] laws. We do not discriminate on the basis of race, color, national origin, age, disability, sex, sexual orientation, or gender identity.            Thank you!     Thank you for choosing [facility] SERVICES  for your care. Our goal is always to provide you with excellent care. Hearing  back from our patients is one way we can continue to improve our services. Please take a few minutes to complete the written survey that you may receive in the mail after your visit with us. Thank you!             Your Updated Medication List - Protect others around you: Learn how to safely use, store and throw away your medicines at www.disposemymeds.org.          This list is accurate as of 4/5/18 11:46 AM.  Always use your most recent med list.                   Brand Name Dispense Instructions for use Diagnosis    amLODIPine 10 MG tablet    NORVASC     Take 10 mg by mouth daily        ibuprofen 200 MG tablet    ADVIL/MOTRIN     Take 800 mg by mouth every 6 hours as needed        lisinopril-hydrochlorothiazide 20-25 MG per tablet    PRINZIDE/ZESTORETIC     Take 20-25 tablets by mouth daily        potassium chloride SA 20 MEQ CR tablet    K-DUR/KLOR-CON M     Take 20 mEq by mouth daily        traMADol 50 MG tablet    ULTRAM     Take by mouth daily as needed

## 2018-04-05 NOTE — MR AVS SNAPSHOT
After Visit Summary   4/5/2018    Gina Stiles    MRN: [id number]           Patient Information     Date Of Birth          1950        Visit Information        Provider Department      4/5/2018 8:00 AM Katelyn Castillo, SLP [facility] Speech and Language        Today's Diagnoses     Oropharyngeal dysphagia    -  1       Follow-ups after your visit        Your next 10 appointments already scheduled     Jul 12, 2018  9:30 AM CDT   PFT VISIT with  PFL CANDICE   [facility] Pulmonary Function Testing ([facility])    [address] [place] [id number] [phone]            Jul 12, 2018 10:00 AM CDT   (Arrive by 9:45 AM)   New ALS/Motor Neuron with Maxime Ny MD   [facility] Neurology ([facility])    [address] [place] [address] [id number] [phone]              Future tests that were ordered for you today     Open Standing Orders        Priority Remaining Interval Expires Ordered    Hepatic panel Routine 3/3 30 days 4/5/2019 4/5/2018          Open Future Orders        Priority Expected Expires Ordered    OCCUPATIONAL THERAPY REFERRAL Routine 4/7/2018 4/5/2019 4/5/2018    PHYSICAL THERAPY REFERRAL Routine 4/5/2018 4/5/2019 4/5/2018    OCCUPATIONAL THERAPY REFERRAL Routine 4/5/2018 4/5/2019 4/5/2018    SPEECH THERAPY REFERRAL Routine 4/5/2018 4/5/2019 4/5/2018            Who to contact     Please call your clinic at [phone] to:    Ask questions about your health    Make or cancel appointments    Discuss your medicines    Learn about your test results    Speak to your doctor            Additional Information About Your Visit        Vozeeme Information     Vozeeme is an electronic gateway that provides easy, online access to your medical records. With Vozeeme, you can request a clinic appointment, read your test results, renew a prescription or communicate with your care team.     To sign up for  MyChart visit the website at [email or web address]   You will be asked to enter the access code listed below, as well as some personal information. Please follow the directions to create your username and password.     Your access code is: [email or web address]  Expires: 2018 10:56 AM     Your access code will  in 90 days. If you need help or a new code, please contact your [facility] or call [phone] for assistance.        Care EveryWhere ID     This is your Care EveryWhere ID. This could be used by other organizations to access your [place] medical records  [id number]         Blood Pressure from Last 3 Encounters:   18 159/79   07 146/90   07 144/86    Weight from Last 3 Encounters:   18 48.7 kg (107 lb 6.4 oz)   07 60.3 kg (133 lb)   07 61.2 kg (135 lb)              Today, you had the following     No orders found for display         Today's Medication Changes          These changes are accurate as of 18  3:22 PM.  If you have any questions, ask your nurse or doctor.               Stop taking these medicines if you haven't already. Please contact your care team if you have questions.     simvastatin 10 MG tablet   Commonly known as:  ZOCOR   Stopped by:  Maxime Ny MD                    Primary Care Provider Office Phone # Fax #    Katelyn Anne Lee [phone]       [address] [place] [address]        Equal Access to Services     [facility] : Hadfranck dukeo Soheydi, waaxda luqadaha, qaybta kaalmada kayleen, kim feldman. So [facility] [phone].    ATENCIÓN: Si habla español, tiene a roberts disposición servicios gratuitos de asistencia lingüística. Llame al [phone].    We comply with applicable federal civil rights laws and [place] laws. We do not discriminate on the basis of race, color, national origin, age, disability, sex, sexual orientation, or  gender identity.            Thank you!     Thank you for choosing  "[employer] SPEECH AND LANGUAGE  for your care. Our goal is always to provide you with excellent care. Hearing back from our patients is one way we can continue to improve our services. Please take a few minutes to complete the written survey that you may receive in the mail after your visit with us. Thank you!             Your Updated Medication List - Protect others around you: Learn how to safely use, store and throw away your medicines at www.disposemymeds.org.          This list is accurate as of 4/5/18  3:22 PM.  Always use your most recent med list.                   Brand Name Dispense Instructions for use Diagnosis    amLODIPine 10 MG tablet    NORVASC     Take 10 mg by mouth daily        ibuprofen 200 MG tablet    ADVIL/MOTRIN     Take 800 mg by mouth every 6 hours as needed        lisinopril-hydrochlorothiazide 20-25 MG per tablet    PRINZIDE/ZESTORETIC     Take 20-25 tablets by mouth daily        potassium chloride SA 20 MEQ CR tablet    K-DUR/KLOR-CON M     Take 20 mEq by mouth daily        traMADol 50 MG tablet    ULTRAM     Take by mouth daily as needed

## 2018-04-06 RX ORDER — GLYCOPYRROLATE 1 MG/1
1 TABLET ORAL 3 TIMES DAILY PRN
Qty: 90 TABLET | Refills: 3 | Status: SHIPPED | OUTPATIENT
Start: 2018-04-06 | End: 2018-09-02

## 2018-04-06 ASSESSMENT — ENCOUNTER SYMPTOMS
MUSCLE CRAMPS: 0
DIFFICULTY FALLING ASLEEP: 0
STIFFNESS: 0
TROUBLE SWALLOWING: 1
NERVOUS/ANXIOUS: 0
EDEMA: 0
EXCESSIVE DAYTIME SLEEPINESS: 0
DIFFICULTY WITH CONCENTRATION: 0
FALLS: 0
ABNORMAL BEHAVIOR: 0
DEPRESSION: 0

## 2018-04-06 NOTE — PROGRESS NOTES
Service Date: 2018      Karin Bray PA-C   [address] [address]      RE: Gina Stiles    MRN: [id number]   : 1950      Dear Ms. Bray:      I saw Gina Stiles today as a new patient referral at the [facility] where she has been referred with a diagnosis of ALS.  Mrs. Stiles is a 68-year-old woman who first noticed a change in her speech in 2016.  It has progressively worsened since then and she has also subsequently developed a reduction in dexterity in the hands, and occasional cramping and muscle twitching in the legs since last fall.  She was recently diagnosed with ALS at the [facility] and is referred here for ongoing management.      Ms. Stiles's relevant system review is notable for a 25-pound weight loss with mealtime fatigue and some coughing after meals.  She has reduced cough effectiveness.  She endorses some sialorrhea.  She denies positive or negative sensory symptoms and denies cognitive or behavioral change.  She neither smokes nor uses alcohol.  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  She is not working currently.      FAMILY HISTORY:  She has 4 children and no siblings.  Her mother had 2 siblings and her father 3.  There is no family history of neurodegenerative or neuromuscular disease.      The patient denies orthopnea or difficulty with breathing.      The patient's past medical history is notable for hyperlipidemia and hypertension which are controlled.  Her medications are documented in the electronic medical record.      EXAMINATION:  The patient is a thin but otherwise healthy-appearing woman.  Vital signs are normal.  Neurologic examination demonstrates the following:  She is alert and cooperative.  There is a moderately severe spastic dysarthria, but speech is nearly fully comprehensible.  Language is intact.   Thought content is normal and she is oriented and reports her history accurately.  Pupils are equal, round and reactive to light.  Extraocular movements are full.  There is neither pathologic nystagmus nor ptosis.  Orbicularis oculi strength is full, but orbicularis oris is impaired to a moderately severe degree.  The palate elevates upon phonation.  Tongue bulk is within broad normal limits but fasciculations are evident and tongue moves slowly and with moderately severe weakness.  Pterygoid, neck flexion, neck extension and sternocleidomastoid strength are all within broad normal limits.  Sensory examination reveals timed vibration of 5-8 seconds at the toes with preserved perception of touch and pin.  Motor examination demonstrates moderately severe symmetric atrophy of intrinsic hand muscles.  Tone is normal or perhaps slightly reduced at the left ankle.  Manual muscle testing demonstrates full strength except for FDI, 4-/3, and APB, 3/2.  Reflexes are 2+ at the jaw, absent in the face, 3+ in the upper limbs with spread to finger flexors from brachioradialis, negative Malik's bilaterally, 3+ at the patellae with vertical spread from left to right, and 2+ at the Achilles without clonus.  Plantar responses are bilaterally upgoing.  She walks independently.  She walks on toes without difficulty but does have difficulty walking on heels.  She has a negative Romberg sign.      Electrodiagnostic studies and brain MRI were reviewed.      I met with Gina and her daughter for greater than 60 minutes.  I indicated the following:  I concur with the diagnosis of ALS.  I discussed the diagnostic process and the natural history of ALS.  I recommended riluzole and edaravone.  We will start her riluzole today if her hepatic panel is normal.  We will enter in a prescription for edaravone to be sent to insurance for approval.  I explained the importance of ventilatory management.  Her forced vital capacity is normal, but her  MIP and MEP are both low.  We will start her on noninvasive ventilation at night and we have taught her lung volume recruitment to improve or maintain cough effectiveness.      With regard to her swallowing, after consultation with our speech language pathologist and dietitian, we all agreed that Mrs. Nava would benefit from relatively early gastrostomy.  She is interested in proceeding with this.  Potential benefit and risks have been discussed.  Weight loss needs to be stemmed if possible.  With respect to sialorrhea, I will start her on Robinul, which she can use as needed.  I explained that there are second and third line options should this not be sufficient.      Mrs. Nava reports that she would not want mechanical ventilation to maintain life at end-stage ALS.  I have encouraged her to complete an advanced directive.      Mrs. Nava met with all members of our multidisciplinary care team today including our research coordinator.  She will follow up in about 2 months.      Sincerely,       MD PEDRO Grant MD             D: 2018   T: 2018   MT: ELLIOT      Name:     DERRICK NAVA   MRN:      [id number]        Account:      [id number]   :      1950           Service Date: 2018      Document: [id number]

## 2018-04-09 DIAGNOSIS — G12.21 ALS (AMYOTROPHIC LATERAL SCLEROSIS) (H): Primary | ICD-10-CM

## 2018-04-09 LAB
EXPTIME-PRE: 7.05 SEC
FEF2575-%PRED-PRE: 99 %
FEF2575-PRE: 1.81 L/SEC
FEF2575-PRED: 1.81 L/SEC
FEFMAX-%PRED-PRE: 71 %
FEFMAX-PRE: 3.75 L/SEC
FEFMAX-PRED: 5.26 L/SEC
FEV1-%PRED-PRE: 89 %
FEV1-PRE: 1.78 L
FEV1FEV6-PRE: 82 %
FEV1FEV6-PRED: 80 %
FEV1FVC-PRE: 82 %
FEV1FVC-PRED: 77 %
FIFMAX-PRE: 1.67 L/SEC
FVC-%PRED-PRE: 85 %
FVC-PRE: 2.18 L
FVC-PRED: 2.54 L
MEP-PRE: 35 CMH2O
MIP-PRE: -27 CMH2O

## 2018-04-10 ENCOUNTER — TELEPHONE (OUTPATIENT)
Dept: NEUROLOGY | Facility: CLINIC | Age: 68
End: 2018-04-10

## 2018-05-01 ENCOUNTER — TELEPHONE (OUTPATIENT)
Dept: NEUROLOGY | Facility: CLINIC | Age: 68
End: 2018-05-01

## 2018-05-01 DIAGNOSIS — G12.21 ALS (AMYOTROPHIC LATERAL SCLEROSIS) (H): Primary | ICD-10-CM

## 2018-05-02 DIAGNOSIS — G12.21 ALS (AMYOTROPHIC LATERAL SCLEROSIS) (H): Primary | ICD-10-CM

## 2018-06-07 ENCOUNTER — TELEPHONE (OUTPATIENT)
Dept: NUTRITION | Facility: CLINIC | Age: 68
End: 2018-06-07

## 2018-06-07 NOTE — TELEPHONE ENCOUNTER
Nutrition Services:     Called Kiera (Gina's daughter) today per request of clinic RNCC.      Kiera reports that her mom's intake has been significantly declining thus wanting to increase volume of tube feeding to facilitate weight stability/weight gain.    Per Kiera's report, her mother has gained ~2.5 lbs since initiation of feeding tube.    She had her tube placed closer to her home and is followed by [facility]ina [facility].     Current regimen:   Formula: Jevity 1.5 cecy  Volume: 350mL TID  Provisions: 1050mL, 1575kcal, 71g protein/day.     Kiera reports that her mother was advised to call RD from [facility] with questions regarding EN adjustments.      This writer advised pt to increase to a volume of 5 cartons/day.   1 1/2 cartons BID with 2 cartons once a day    Provisions:  5 cartons/day (1250mL, 950 ml free water),1875kcal, 85 g protein     Advised Kiera to call [facility] for adjustment in formula volume.    This RD available if more orders are needed.     Loren Magaña RD, LD

## 2018-07-25 ENCOUNTER — TELEPHONE (OUTPATIENT)
Dept: NUTRITION | Facility: CLINIC | Age: 68
End: 2018-07-25

## 2018-07-26 ENCOUNTER — DOCUMENTATION ONLY (OUTPATIENT)
Dept: NUTRITION | Facility: CLINIC | Age: 68
End: 2018-07-26

## 2018-07-26 DIAGNOSIS — G12.21 ALS (AMYOTROPHIC LATERAL SCLEROSIS) (H): Primary | ICD-10-CM

## 2018-07-26 NOTE — TELEPHONE ENCOUNTER
Nutrition Services:     Received a message from ALS clinic, nurse care coordinator:     Please call daughter, Rach on Gina's phone [phone] re:   formula change. She says Gina is using Jevity, makes her nauseous, losing weight, etc...   Thanks.     RD called Gina and spoke with her and her daughter, Kiera via speaker phone.    Kiera reports that her mother has been experiencing increased nausea and fatigue.    She denies vomiting or weight loss.     Current EN regimen:  Formula: Ensure Plus, Premiere protein shakes  Volume: 415mL formula (1/2 Ensure Plus, 1/2 Premiere shake), 120 mL water  Cycle: gravity feedings (~535 mL formula/water TID) infused within 20-30 minutes.    Provisions: 1245kcal, 66g protein  Ensure Plus (350kcal, 13g protein/bottle) Premiere Protein shake (160kcal, 30g protein/carton)    She has discontinued Jevity 1.5 formula as she reports that this is making her nauseated and tired.    Since she stopped taking Jevity, she no longer has nausea.  Previously, she was taking 1/2 Ensure and 1/2 Jevity combination.   She is also taking a home made pureed blend including a variety of fruits, vegetables and V8 juice, 2-3 times/week. This is blended very thinly and flushed with water before and after feedings.  She denies issues with tube clogging.     Gina and her daughter inquire about changing to a natural blend formula.      Interventions:    -Reviewed alternative formulas that may be available pending insurance coverage (Real Food Blends, Compleat, Liquid Hope etc).   -Discussed infusion rate - strongly recommended they   1) Slow feedings down to 45-60 minutes per feeding OR,    2) Reduce volume to 375 mL formula, feeding QID  -Message sent to [facility] home infusion company, JOHN Naranjo regarding inquiring changes.  Will await return phone call from [facility]. Lena Acosta: [phone]  -JOHN will collaborate with patient and care team regarding potential changes.        Loren Magaña RD, LD

## 2018-07-26 NOTE — PROGRESS NOTES
Nutrition Services:     Received a return phone call/vm from [facility] Lena LOPEZ.   Lena confirmed that Organic real food tube feeding blends are available and covered.      New order placed:  Formula: Compleat rganic blends  Volume: 4 pouches/day (10oz pouch)  Provisions: 1200mL free water, 1520kcal (32kcal/kg), 56g protein (1.2g pro/kg), 18g fiber daily    New order faxed:  [facility]: [phone]    RD called patient and her daughter, Kiera to inform them of the new order.    Advised pt to transition to new formula slowly.    Recommendations for formula transition:  Day 1: 2 feedings of original feeding routine (Ensure/Premier protein), 1 pouch of Compleat  Day 2: 1 feeding of original feeding routine (Ensure/Premier protein), 2 pouches of Compleat  Day 3:  1 feeding of original feeding routine (Ensure), 3 pouches of Compleat  Day 4: 1 pouch of Compleat QID    Interventions:   -Formula contains 300mL free water in each pouch (1200mL water, ~5 cups water).  -Advised pt to take an additional 3 cups water daily via flushes for tube patency and hydration.  -Discussed potential thicker viscosity of new formula.  Discussed ability to add water to formula to thin it out.   -Advised pt to transition very slowly as above schedule.  Advised pt to infuse via gravity feedings in 45-60 min (slower than current regimen of 20-30 minutes).   -Discussed fiber content of new formula. Current formula will provide a total of 24g of fiber which is almost 15 grams more that what she was receiving.     -Discussed potential gas/bloating, nausea that may be associated with new formula, especially if infused too quickly.    Advised pt and daughter to call RD with questions.  RD will follow-up with patient via phone on 8/1.  - EN intake/tolerance  - Weight trends    Loren Magaña RD, LD

## 2018-07-30 DIAGNOSIS — G12.21 ALS (AMYOTROPHIC LATERAL SCLEROSIS) (H): Primary | ICD-10-CM

## 2018-08-01 NOTE — PATIENT INSTRUCTIONS
"If you want the dislodged G-tube addressed yet today, please return to the [facility]. Be sure they advise you on caring for the site and how to schedule a \"Tube to Button\" appointment.    For non-urgent questions, concerns or scheduling issues call [facility] @ [phone]. Your call will be returned within 24 hours, during regular business hours.    Emergencies should be directed to [facility] @ [phone] (ask for the Neuro [id number]), the nearest Emergency Room, or 911.  "

## 2018-08-02 ENCOUNTER — EXTERNAL ORDER RESULTS (OUTPATIENT)
Dept: CARE COORDINATION | Facility: CLINIC | Age: 68
End: 2018-08-02

## 2018-08-02 ENCOUNTER — ALLIED HEALTH/NURSE VISIT (OUTPATIENT)
Dept: NUTRITION | Facility: CLINIC | Age: 68
End: 2018-08-02

## 2018-08-02 ENCOUNTER — THERAPY VISIT (OUTPATIENT)
Dept: PHYSICAL THERAPY | Facility: CLINIC | Age: 68
End: 2018-08-02
Payer: MEDICARE

## 2018-08-02 ENCOUNTER — THERAPY VISIT (OUTPATIENT)
Dept: SPEECH THERAPY | Facility: CLINIC | Age: 68
End: 2018-08-02
Payer: MEDICARE

## 2018-08-02 ENCOUNTER — OFFICE VISIT (OUTPATIENT)
Dept: NEUROLOGY | Facility: CLINIC | Age: 68
End: 2018-08-02
Payer: MEDICARE

## 2018-08-02 VITALS
DIASTOLIC BLOOD PRESSURE: 78 MMHG | WEIGHT: 110.6 LBS | BODY MASS INDEX: 20.88 KG/M2 | RESPIRATION RATE: 20 BRPM | HEART RATE: 93 BPM | OXYGEN SATURATION: 98 % | HEIGHT: 61 IN | SYSTOLIC BLOOD PRESSURE: 136 MMHG

## 2018-08-02 DIAGNOSIS — G12.21 ALS (AMYOTROPHIC LATERAL SCLEROSIS) (H): ICD-10-CM

## 2018-08-02 DIAGNOSIS — G12.21 ALS (AMYOTROPHIC LATERAL SCLEROSIS) (H): Primary | ICD-10-CM

## 2018-08-02 DIAGNOSIS — Z74.09 IMPAIRED MOBILITY AND ADLS: Primary | ICD-10-CM

## 2018-08-02 DIAGNOSIS — R13.12 OROPHARYNGEAL DYSPHAGIA: ICD-10-CM

## 2018-08-02 DIAGNOSIS — Z78.9 IMPAIRED MOBILITY AND ADLS: Primary | ICD-10-CM

## 2018-08-02 DIAGNOSIS — G12.21 AMYOTROPHIC LATERAL SCLEROSIS (H): Primary | ICD-10-CM

## 2018-08-02 DIAGNOSIS — R47.1 DYSARTHRIA: Primary | ICD-10-CM

## 2018-08-02 PROBLEM — G12.29 ANTERIOR HORN CELL DISEASE (H): Status: ACTIVE | Noted: 2017-12-01

## 2018-08-02 ASSESSMENT — REVISED AMYOTROPHIC LATERAL SCLEROSIS FUNCTIONAL RATING SCALE (ALSFRS-R)
SALIVATION: SLIGHT BUT DEFINITE EXCESS OF SALIVA IN MOUTH; MAY HAVE NIGHTTIME DROOLING
SWALLOWING: 1
HANDWRITING: SLOW OR SLOPPY: ALL WORDS ARE LEGIBLE
RESPIRATORY_INSUFFICIENCY: INTERMITTENT USE OF NIPPV
CLIMBING_STAIRS: 1
SPEECH: 2
ORTHOPENA: NEEDS EXTRA PILLOWS IN ORDER TO SLEEP (MORE THAN TWO)
DRESSING_AND_HYGEINE: 3
DYSPNEA: 3
CUTTING_FOOD_AND_HANDLING_UTENSILES: 2
GROSS_MOTOR_SUBTOTAL_SCORE: 7
TURNING_IN_BED_AND_ADJUSTING_BED_CLOTHES: 3
SPEECH: INTELLIGIBLE WITH REPEATING
CLIMBING_STAIRS: NEEDS ASSISTANCE
BULBAR_SUBTOTAL: 6
SALIVATION: 3
HANDWRITING: 3
FINE_MOTOR_SUBTOTAL_SCORE: 8
RESPIRATORY_INSUFFICIENCY: 3
SIX_ITEM_SUBTOTAL: 17
SWALLOWING: NEEDS SUPPLEMENTAL TUBE FEEDING
WALKING: EARLY AMBULATION DIFFICULTIES
DYSPNEA: OCCURS WHEN WALKING
DRESSING_AND_HYGEINE: INDEPENDENT AND COMPLETE SELF-CARE WITH EFFORT OR DECREASED EFFICIENCY
ALSFRS_TOTAL_SCORE: 29
ORTHOPENA: 2
WALKING: 3
TURNING_IN_BED_AND_ADJUSTING_BED_CLOTHES: SOMEWHAT SLOW AND CLUMSY, BUT NO HELP NEEDED
RESPIRATORY_SUBTOTAL_SCORE: 8

## 2018-08-02 ASSESSMENT — PAIN SCALES - GENERAL: PAINLEVEL: NO PAIN (0)

## 2018-08-02 NOTE — PROGRESS NOTES
[id number]          Gina failed Jevity because of nausea. A specialty formula is necessary to reduce her symptoms. I met with the patient to discuss this.

## 2018-08-02 NOTE — DISCHARGE INSTRUCTIONS
Neck Support    Headmaster-  [email or web address]  Ballert [employer]he Collar - http://www.ballert.com/index.php/ifnimaxz-egzrrzqehj-xnnqoyzo/ballert-oxford-collar     [facility] [address] [address]  [phone]    [facility]  [address] [place] [id number]  Open: Monday - Friday  Hours: 8-4:30  Appointments are preferred.  Parking: On-Site Lot  Phone:  [phone]  Fax:  [phone]      Joanie Munoz PT, DPT  Physical Therapist  [facility]  Outpatient Rehabilitation Services, [address]  [place] [address]  [email or web address]  Office: [phone]  Pager: [phone]  Fax: [phone]

## 2018-08-02 NOTE — PROGRESS NOTES
CLINICAL NUTRITION SERVICES - REASSESSMENT NOTE   EVALUATION OF PREVIOUS PLAN OF CARE:     New order placed 7/26:  Formula: Compleat Organic blends  Volume: 4 pouches/day (10oz pouch)  Provisions: 1200mL free water, 1520kcal (32kcal/kg), 56g protein (1.2g pro/kg), 18g fiber daily     New order faxed:  [facility]: [phone]     RD called patient and her daughter, Kiera to inform them of the new order.    Advised pt to transition to new formula slowly.      Per [facility]: More documentation is needed to justify coverage of specialty formula.      Pt seen today in multidisciplinary ALS clinic for re-assessment.   Referring Physician: Yanely  Current diet: NPO  PEG Tube: Yes    Monitoring from previous assessment:   -Food intake, G tube placement - taking 100% estimated nutrition needs via FT.    1. Patient has been complaining of nausea, vomiting with Jevity formula.  When she infuses Ensure or premiere protein, she denies symptoms.    2. She would like to try a more natural formula with less HFCS to see if symptoms resolve as she is not used this way of eating.  She notes that she has been infusing 1 1/2 cartons in 30-40 minutes via gravity feedings.     -Fluid/beverage intake - NPO    -Weight trends - stable   Wt Readings from Last 6 Encounters:   08/02/18 50.2 kg (110 lb 9.6 oz)   04/05/18 48.7 kg (107 lb 6.4 oz)   09/21/07 60.3 kg (133 lb)   09/18/07 61.2 kg (135 lb)   08/14/07 61.7 kg (136 lb 1.9 oz)   06/18/07 62.6 kg (138 lb 1.3 oz)       Previous Goals:   1.  Aim for 5-6 small frequent meals - goal not met  2.  Aim for 1900kcal (per FRS) and 60g protein/day - goal met  3. Weight maintenance - goal met  Evaluation: Not met   Previous Nutrition Diagnosis:   Inadequate oral intake related to dysphagia, meal time fatigue as evidenced by 20% wt loss x past 6 months  Evaluation: Completed     NEW FINDINGS:   Nausea, vomiting, fatigue   CURRENT NUTRITION DIAGNOSIS   Inadequate oral intake related to dysphagia as  evidenced by pt dependent on FT to meet 100% of her nutrition needs.    INTERVENTIONS   Recommendations / Nutrition Prescription   Recommendations for formula transition:  Day 1: 2 feedings of original feeding routine (Ensure/Premier protein or Jevity), 1 pouch of Compleat  Day 2: 1 feeding of original feeding routine (Ensure/Premier protein or Jevity), 2 pouches of Compleat  Day 3:  1 feeding of original feeding routine (Ensure or Jevity), 3 pouches of Compleat  Day 4: 1 pouch of Compleat QID    Formula contains 300mL free water in each pouch (1200mL water, ~5 cups water).    Interventions:   -Advised pt to take an additional 3 cups water daily via flushes for tube patency and hydration.  -Discussed potential thicker viscosity of new formula.  Discussed ability to add water to formula to thin it out.   -Advised pt to transition very slowly as above schedule.  Advised pt to infuse via gravity feedings in 45-60 min (slower than current regimen of 30 minutes).   -Discussed fiber content of new formula. Current formula will provide a total of 24g of fiber which is almost 15 grams more that what she was receiving.     -Discussed potential gas/bloating, nausea that may be associated with new formula, especially if infused too quickly.     Advised pt and daughter to call RD with questions.   - EN intake/tolerance  - Weight trends     Loren Magaña, JOHN, LD

## 2018-08-02 NOTE — MR AVS SNAPSHOT
After Visit Summary   8/2/2018    Gina Stiles    MRN: [id number]           Patient Information     Date Of Birth          1950        Visit Information        Provider Department      8/2/2018 11:51 AM Loren Jacob RD [facility], [place], Nutrition Services        Today's Diagnoses     Amyotrophic lateral sclerosis (H)    -  1       Follow-ups after your visit        Your next 10 appointments already scheduled     Dec 20, 2018  1:30 PM CST   PFT VISIT with TANIKA PFL B   [facility] Pulmonary Function Testing ([facility])    [address] [place] [id number]   [phone]            Dec 20, 2018  2:00 PM CST   (Arrive by 1:45 PM)   Return ALS/Motor Neuron with Maxime Ny MD   [facility] Neurology ([facility])    [address] [place] [id number] [phone]              Future tests that were ordered for you today     Open Future Orders        Priority Expected Expires Ordered    PHYSICAL THERAPY REFERRAL Routine 8/2/2018 8/1/2019 8/1/2018    OCCUPATIONAL THERAPY REFERRAL Routine 8/2/2018 8/1/2019 8/1/2018    SPEECH THERAPY REFERRAL Routine 8/7/2018 8/1/2019 8/1/2018            Who to contact     If you have questions or need follow up information about today's clinic visit or your schedule please contact [facility], [place], NUTRITION SERVICES directly at No information on file..  Normal or non-critical lab and imaging results will be communicated to you by MyChart, letter or phone within 4 business days after the clinic has received the results. If you do not hear from us within 7 days, please contact the clinic through MyChart or phone. If you have a critical or abnormal lab result, we will notify you by phone as soon as possible.  Submit refill requests through [facility] or call your pharmacy and they will forward the refill request to us. Please allow 3 business days for your refill to  "be completed.          Additional Information About Your Visit        KRAFTWERKhart Information     [employer] lets you send messages to your doctor, view your test results, renew your prescriptions, schedule appointments and more. To sign up, go to [email or web address] . Click on \"Log in\" on the left side of the screen, which will take you to the Welcome page. Then click on \"Sign up Now\" on the right side of the page.     You will be asked to enter the access code listed below, as well as some personal information. Please follow the directions to create your username and password.     Your access code is: [email or web address]  Expires: 10/17/2018  6:30 AM     Your access code will  in 90 days. If you need help or a new code, please call your [place] clinic or [phone].        Care EveryWhere ID     This is your Care EveryWhere ID. This could be used by other organizations to access your [place] medical records  [id number]         Blood Pressure from Last 3 Encounters:   18 136/78   18 159/79   07 146/90    Weight from Last 3 Encounters:   18 50.2 kg (110 lb 9.6 oz)   18 48.7 kg (107 lb 6.4 oz)   07 60.3 kg (133 lb)              Today, you had the following     No orders found for display       Primary Care Provider Office Phone # Fax #    Katelyn Anne Lee [phone]       [address] [place] [address]        Equal Access to Services     [facility]: Hadii eloy quiñoens hadasho Soomaali, waaxda luqadaha, qaybta kaalmada adeashishyaokri, kim lee . So [facility] [phone].    ATENCIÓN: Si habla español, tiene a roberts disposición servicios gratuitos de asistencia lingüística. Llame al [phone].    We comply with applicable federal civil rights laws and [place] laws. We do not discriminate on the basis of race, color, national origin, age, disability, sex, sexual orientation, or gender identity.            Thank you!     Thank " you for choosing [facility] [place], NUTRITION SERVICES  for your care. Our goal is always to provide you with excellent care. Hearing back from our patients is one way we can continue to improve our services. Please take a few minutes to complete the written survey that you may receive in the mail after your visit with us. Thank you!             Your Updated Medication List - Protect others around you: Learn how to safely use, store and throw away your medicines at www.disposemymeds.org.          This list is accurate as of 8/2/18  3:11 PM.  Always use your most recent med list.                   Brand Name Dispense Instructions for use Diagnosis    amLODIPine 10 MG tablet    NORVASC     Take 10 mg by mouth daily        glycopyrrolate 1 MG tablet    ROBINUL    90 tablet    Take 1 tablet (1 mg) by mouth 3 times daily as needed May increase to 2 tabs if needed. Reduce dose if dryness becomes excessive.    Disturbance of salivary secretion       ibuprofen 200 MG tablet    ADVIL/MOTRIN     Take 800 mg by mouth every 6 hours as needed        lisinopril-hydrochlorothiazide 20-25 MG per tablet    PRINZIDE/ZESTORETIC     Take 20-25 tablets by mouth daily        potassium chloride SA 20 MEQ CR tablet    K-DUR/KLOR-CON M     Take 20 mEq by mouth daily        riluzole 50 MG tablet    RILUTEK    60 tablet    Take 1 tablet (50 mg) by mouth every 12 hours    ALS (amyotrophic lateral sclerosis) (H)       traMADol 50 MG tablet    ULTRAM     Take by mouth daily as needed

## 2018-08-02 NOTE — MR AVS SNAPSHOT
"              After Visit Summary   8/2/2018    Gina Stiles    MRN: [id number]           Patient Information     Date Of Birth          1950        Visit Information        Provider Department      8/2/2018 12:30 PM Maxime Ny MD [facility] Neurology        Today's Diagnoses     ALS (amyotrophic lateral sclerosis) (H)    -  1    Oropharyngeal dysphagia          Care Instructions    If you want the dislodged G-tube addressed yet today, please return to the [facility]. Be sure they advise you on caring for the site and how to schedule a \"Tube to Button\" appointment.    For non-urgent questions, concerns or scheduling issues call [facility] @ [phone]. Your call will be returned within 24 hours, during regular business hours.    Emergencies should be directed to [facility] @ [phone] (ask for the Neuro [id number]), the nearest Emergency Room, or 911.          Follow-ups after your visit        Additional Services     OCCUPATIONAL THERAPY REFERRAL       OT Clinician to evaluate and treat patient in ALS Clinic.            PHYSICAL THERAPY REFERRAL       PT Clinician to evaluate and treat patient in ALS Clinic.            SPEECH THERAPY REFERRAL       Speech Language Pathologist to evaluate and treat patient in ALS Clinic.                  Your next 10 appointments already scheduled     Dec 20, 2018  1:30 PM CST   PFT VISIT with  PFL B   [facility] Pulmonary Function Testing ([facility])    [address] [place] [id number] [phone]            Dec 20, 2018  2:00 PM CST   (Arrive by 1:45 PM)   Return ALS/Motor Neuron with Maxime Ny MD   [facility] Neurology ([facility])    [address] [id number] [phone]              Future tests that were ordered for you today     Open Future Orders        Priority Expected Expires Ordered    PHYSICAL THERAPY REFERRAL Routine " "2018    OCCUPATIONAL THERAPY REFERRAL Routine 2018    SPEECH THERAPY REFERRAL Routine 2018            Who to contact     Please call your clinic at [phone] to:    Ask questions about your health    Make or cancel appointments    Discuss your medicines    Learn about your test results    Speak to your doctor            Additional Information About Your Visit        IOCShart Information     [employer] is an electronic gateway that provides easy, online access to your medical records. With [employer], you can request a clinic appointment, read your test results, renew a prescription or communicate with your care team.     To sign up for [employer] visit the website at [email or web address]   You will be asked to enter the access code listed below, as well as some personal information. Please follow the directions to create your username and password.     Your access code is: [email or web address]  Expires: 10/17/2018  6:30 AM     Your access code will  in 90 days. If you need help or a new code, please contact your [facility] or call [phone] for assistance.        Care EveryWhere ID     This is your Care EveryWhere ID. This could be used by other organizations to access your [place] medical records  [id number]        Your Vitals Were     Pulse Respirations Height Pulse Oximetry BMI (Body Mass Index)       93 20 1.549 m (5' 1\") 98% 20.9 kg/m2        Blood Pressure from Last 3 Encounters:   18 136/78   18 159/79   07 146/90    Weight from Last 3 Encounters:   18 50.2 kg (110 lb 9.6 oz)   18 48.7 kg (107 lb 6.4 oz)   07 60.3 kg (133 lb)               Primary Care Provider Office Phone # Fax #    Katelyn Anne Lee [phone] [address] [place] [address]        Equal Access to Services     GLADYS STOLL AH: dipti Buchanan " coreykade kim malik. So [facility] [phone].    ATENCIÓN: Si travis black, tiene a roberts disposición servicios gratuitos de asistencia lingüística. Cony al [phone].    We comply with applicable federal civil rights laws and [place] laws. We do not discriminate on the basis of race, color, national origin, age, disability, sex, sexual orientation, or gender identity.            Thank you!     Thank you for choosing [facility] NEUROLOGY  for your care. Our goal is always to provide you with excellent care. Hearing back from our patients is one way we can continue to improve our services. Please take a few minutes to complete the written survey that you may receive in the mail after your visit with us. Thank you!             Your Updated Medication List - Protect others around you: Learn how to safely use, store and throw away your medicines at www.disposemymeds.org.          This list is accurate as of 8/2/18  2:54 PM.  Always use your most recent med list.                   Brand Name Dispense Instructions for use Diagnosis    amLODIPine 10 MG tablet    NORVASC     Take 10 mg by mouth daily        glycopyrrolate 1 MG tablet    ROBINUL    90 tablet    Take 1 tablet (1 mg) by mouth 3 times daily as needed May increase to 2 tabs if needed. Reduce dose if dryness becomes excessive.    Disturbance of salivary secretion       ibuprofen 200 MG tablet    ADVIL/MOTRIN     Take 800 mg by mouth every 6 hours as needed        lisinopril-hydrochlorothiazide 20-25 MG per tablet    PRINZIDE/ZESTORETIC     Take 20-25 tablets by mouth daily        potassium chloride SA 20 MEQ CR tablet    K-DUR/KLOR-CON M     Take 20 mEq by mouth daily        riluzole 50 MG tablet    RILUTEK    60 tablet    Take 1 tablet (50 mg) by mouth every 12 hours    ALS (amyotrophic lateral sclerosis) (H)       traMADol 50 MG tablet    ULTRAM     Take by mouth daily as needed

## 2018-08-02 NOTE — PROGRESS NOTES
"    OUTPATIENT PHYSICAL THERAPY CLINIC NOTE  Gina Stiles  YOB: 1950  [id number]    Type of visit:         Evaluation     Date of service: 8/2/2018    Referring provider: Dr. Ny    Others present at visit:  Child(nika), daughter, Kiera    Medical diagnosis:   Amyotrophic lateral sclerosis (ALS)    Date of diagnosis: 4/5/18    Pertinent history of current problem (include personal factors and/or comorbidities that impact the plan of care): Onset of dysarthria 2 years and dysphagia Aug 2017 and some hand weakness changes since summer 2017, now distal LE weakness R in summer 2018.    Cardio-respiratory status:  Forced vital capacity: 76 % of predicted     Height/Weight: 5'1\" / 110 lb     Living environment:  House with Kiera jackson     Living environment barriers:  4 stairs to enter (1 railing present)  Tub/shower built in grab bar-plastic             Regular height toilet    Current assistance/living environment:  Lives with daughter      Current mobility equipment:  Cane- rarely uses  *4ww requested today  *Transport WC requested today  *Ossur foot-up R recommended today  *Orthotic referral for head support entered today     Current ADL equipment:  None      Technology used: cell phone    Patient concerns/goals: Notices catching toe, her shoes are too big. More unsteady, especially if turning quickly.     Evaluation   Interview completed.   Pain assessment:  Pain denied   Range of motion: Ankles to neutral, otherwise WFL B LE    Manual muscle testing: Hip flexion 4+/5 R, 5-/5 L, hip abd/add 5/5 B, knee flex/ext 5/5 B, ankle DF 4/5 R, 5/5 L, PF strong manually   Gait: Pt amb without assistive device with head drop and compensatory sway back posture, slowed pace and meandering path.   Cognition: Intact.    Fall Risk Screen:   Has the patient fallen 2 or more times in the last year? No      Has the patient fallen and had an injury in the past year? No       Timed Up and Go Score: 10.38 " sec, no assistive device     25ft walk: 9.45 sec, 16 steps no assistive device; 9.88 sec, 16 steps with 4ww trial    Is the patient a fall risk? Yes, department fall risk interventions implemented     Impairments:  Fatigue  Muscle atrophy  Coordination  Balance     Treatment diagnosis:  Impaired mobility  Impaired activities of daily living    Clinical Presentation: Evolving/Changing  Clinical Presentation Rationale: Gradually progressive nature of disease, now affecting distal LE function, head drop, and respiratory limitations.  Clinical Decision Making (Complexity): Low complexity     Recommendations/Plan of care:  1 session evaluation & treatment.  Recommend referral to Orthotics- to be fit for Headmaster or Ballert Oxford cervical collar (pt identified locations of [facility] in [place] or [place] in [place]; if they have trouble locating these types of collars may need to go through [employer], they're going to call ahead.  Equipment recommended: Headmaster or Ballert Oxford Collar   4 wheeled walker and Transport wheelchair- requested from [employer]- pt to purchase online, provided Amazon info.     Goals:   Target date: 8/2/18  Patient, family and/or caregiver will verbalize understanding of evaluation results and implications for functional performance.  Patient, family and/or caregiver will verbalize/demonstrate understanding of compensatory methods /equipment to enhance functional independence and safety.    Educational assessment/barriers to learning:   No barriers noted     Treatment provided this date:   Gait training, 15 minutes  -Ankle support options described, with education for how to don and use for DF assistance during gait to reduce tripping risk and improve efficiency. D/t slower disease progression pt would likely be able to use Ossur Foot-up for some time, but may then need to transition to more rigid AFO support, showed pt both options and pt open to online purchase of Ossur  Foot-up for R LE support. Provided handout for Amazon purchase, described how to setup.  -4ww trial completed with instruction for safe use of 4ww and brakes provided, with comparison to gait without assistive device and with cane. Note meandering path and swayback posture persisted with cane use. Pt notes she doesn't like the cane. With trial of 4ww note pt able to correct her torso posture and path is straight. Pt open to 4ww, requested from [facility]    Self-care home management, 8 minutes  -Neck support options explained, with pictures shown. Recommend trial of Headmaster and Ballert White collars. We located Orthotists in pt area, but requested they call ahead to ensure they'll have the type of braces she should trial. If they don't, she may need to go to a [place] location. I provided my contact info in case of questions.  -Daughter requests transport WC for outings d/t pt fatigue. Pt initially hesitant, but as we discussed benefits she did agree it would be helpful, [facility] from [facility].    Response to treatment/recommendations: Verbalizes understanding    Goal attainment:  All goals met     Risks and benefits of evaluation/treatment have been explained.  Patient, family and/or caregiver are in agreement with Plan of Care.     Timed Code Treatment Minutes: 23  Total Treatment Time (sum of timed and untimed services): 31    Signature: Sabina Munoz, PT   Date: 8/2/2018    Medicare G-code:  Mobility: Walking and Moving Around  Current Status , Goal , Discharge   1 session only, modifier the same for all G-codes.  CJ: 20-39% impairment  Modifier determined by clinical judgment in conjunction with objective data and subjective report.      Certification: Medicare  Onset date: 8/2/2018  Start of care date: 8/2/2018  Certification date from 8/2/2018 to 8/2/2018    I CERTIFY THE NEED FOR THESE SERVICES FURNISHED UNDER        THIS PLAN OF TREATMENT AND WHILE UNDER MY CARE     (Physician attestation of  this document indicates review and certification of the therapy plan).

## 2018-08-02 NOTE — PROGRESS NOTES
Outpatient Speech Language Pathology Neurology Clinic Evaluation  Gina Stiles YOB: 1950 [id number]    Visit Date: 8/2/18, last SLP visit 4/5/2018    Age: 68 year old    Medical Diagnosis: Amyotrophic lateral sclerosis (ALS)    Date of Diagnosis: 01/2018 at [place]    Referring MD: Dr Maxime Ny    PMH: Refer to Medical Chart    Fall Risk:Refer to physician report.    Others at Clinic Visit:  Children, daughter Rubi    Living Situation:   With others    Patient Concerns/Goals:  Increased speech deficits  Augmentative / Alternative communication needs    Observations: Patient seen with daughter present, both pleasant and cooperative. They report increased fatigue with speaking and difficulty being understood.    Current Mode of Nutrition:   Tube fed, Percutaneous endoscopic gastrostomy  Drinks water and coffee for pleasure    Weight: 111lbs, up from 107lbs last visit but previous 25 pound weight loss    Cardio-Respiratory Status: Forced vital capacity: 76% compared to 85% last visit    Functional Rating Scale (ALS-FRS): 29/48 compared to 37/48 at last visit    Oral Motor/Swallowing  Oral Motor Function:  Anomalies present:    Labial anomaly- moderate weakness with slow effortful movement  Lingual anomaly- moderate to severe weakness, slow effortful movement  Velar- hypernasal  Tongue fasciculation present    Volitional Abilities:  Cough- Weak  Throat Clear- Functional  Swallow- Present    Feeding Assistance: Frequent cues/help required    Swallow Function:   Thin Liquid-  Moderately poor bolus control, anterior loss of liquids, oral residue, reduced A-P propulsion. Delayed swallow initiation and reduced laryngeal elevation with immediate throat cleared noted immediately after intake. Patient has previously reported this is strategy which was taught to her to clear residue.    Dysphagia Diagnosis: Moderate-severe dysphagia. She had feeding tube placed 4/26/18 and consumes only thin water and  coffee for pleasure. 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     Speech Intelligibility/Functional Communication   Methods of communication: Verbal    Dysarthria: Moderate-severe    Speech:   Deficits in phonation- Strained-strangled quality (spastic) with a quiver noted and breath quality  Deficits in articulation- Decreased precision of articulation and Slow effortful rate. Speaking rate found to be 61 words per minute during reading of the Grandfather Passage, she was not able to complete the entire passage due to SOB and fatigue. [place] SLP 12/27/18 found speaking rate of 96 words per minutes. Both significantly less than normal range 140-150wpm.  Deficits in resonance- Hypernasal  Intelligibility- 85% with her independent use of strategies in quiet environment    Speech Intelligibility/Communication:  Impacts social activities and Impacts phone usage    Augmentative and Alternative Communication (AAC):   Educated re Voice Banking last visit but this was not completed due to vocal fatigue. She has Speak 4 Me joe on her smart phone which she has been using but she describes increased difficulty accessing small buttons on phone touchscreen. SLP educated and demonstrated use of iPad with apps. Educated re several AAC apps but encouraged use of Speech Assistant as she could download on her phone for free and then get iPad from [employer] with the same joe on both devices. Provided handout with information re use of joe including saving commonly used messages. Encouraged them to contact this SLP with questions or concerns.    Clinical Impression/Plan of Care  Treatment Diagnosis: Oropharyngeal Dysphagia and Dysarthia     Recommendations:  Continue use of compensatory strategies.  Continue compensatory speech strategies.  Initiate use of AAC device.  Continue adequate oral cares due to high risk of aspiration.    Plan of  Care:  Evaluation only.    Goals: Based on today's evaluation session patient and/or caregiver will have understanding of current communication and/or swallowing status and recommendations for management.    Educational Assessment:  Learners- Patient and Children  Barriers to Learning- No barriers.    Education provided/response:   Speech  Swallowing  AAC  Diet  Feeding tube  Verbalized understanding    Response to recommendations/treatment: verbalized understanding, ask appropriate questions, and agreed to POC/recommendations    Goal attainment: All goals met    Risks and benefits of evaluation/treatment have been explained.  Patient, family and/or caregiver are in agreement with Plan of Care.    Timed Code Treatment Minutes: 0 minutes timed    Total Treatment Time (sum of timed and untimed services): 25 minutes    Medicare G-code:  Motor Speech  Current Status , Goal , Discharge   1 session only, modifier the same for all G-codes.  CK: 40-59% impairment  Modifier determined by clinical judgment in conjunction with objective data and subjective report.    Certification:  Onset date: 8/2/18  Start of care date: 8/2/2018  Certification date from 8/2/2018 to 8/2/18    I CERTIFY THE NEED FOR THESE SERVICES FURNISHED UNDER        THIS PLAN OF TREATMENT AND WHILE UNDER MY CARE     (Physician attestation of this document indicates review and certification of the therapy plan).

## 2018-08-02 NOTE — LETTER
2018       RE: Gnia Stiles  [address]  [facility] [address]     Dear Colleague,    Thank you for referring your patient, Gina Stiles, to the [facility] NEUROLOGY at [facility]. Please see a copy of my visit note below.    [id number]          Gina failed Jevity because of nausea. A specialty formula is necessary to reduce her symptoms. I met with the patient to discuss this.    Service Date: 2018      Karin Bray PA-C   [address] [address]      RE: Gina Stiles   MRN: [id number]   : 1950      Dear Ms. Bray:       I saw Gina Stiles in followup at the [facility] where I have seen her previously for diagnosis of ALS.  Ms. Stiles reports she is doing generally well.  She underwent gastrostomy and is using the tube with some difficulty tolerating Jevity.  For that reason, it is medically necessary that she switch to a different formula as documented elsewhere in my note today.  I discussed this with Gina and her daughter today.  She has some redness around the site and there may be a small amount of whitish exudate as well.  They have purchased an antifungal cream and will begin to use that.  Finally, with regard to her tube today, she noted that it seemed to be partly out when I met with her.  We will discuss arrangements to have this replaced and ultimately she would benefit from having a DANYEL-KEY tube.      With regard to her ALS, she is using noninvasive ventilation several hours a day, but has difficulty sleeping with it at night.  She denies symptoms of sleep disordered breathing, however, and has no morning headaches.  She does have some intermittent neck discomfort which is likely muscular.  We discussed using massage and a neck brace to assist with this.  She has had no falls and is living with her daughter.  She has chosen  not to use riluzole.      Examination demonstrates an alert and cooperative woman seated in a wheelchair.  She has marked dysarthria but speech is about 60% comprehensible.  There is atrophy and markedly reduced strength of the tongue.  There is equivocal weakness of neck flexion and extension and equivocal weakness of pterygoid strength.  She has good strength proximally in upper and lower limbs with mild to moderate distal weakness of hands and feet and an incipient foot drop on the right in particular.  She does walk independently, however, limited only by dyspnea with substantial exertion.  Her pulmonary function studies again demonstrate a good forced vital capacity, but reduced MIP and MEP.  She is using breath stacking in addition to NIV.      Gina met with other members our multidisciplinary care team today.  She will have her tube replaced today if possible.  She will return in 3 months.  We did discuss ALS research as well and because of her distance from ALS centers, she felt this would not be feasible currently.      Sincerely,       Maxime Ny MD       D: 2018   T: 2018   MT: ELLIOT      Name:     GINA NAVA   MRN:      [id number]        Account:      [id number]   :      1950           Service Date: 2018      Document: [id number]

## 2018-08-02 NOTE — NURSING NOTE
Chief Complaint   Patient presents with     RECHECK     [facility] RETURN - MULTIPLE SCLEROSIS     Althea Ash MA

## 2018-08-02 NOTE — MR AVS SNAPSHOT
After Visit Summary   8/2/2018    Gina Stiles    MRN: [id number]           Patient Information     Date Of Birth          1950        Visit Information        Provider Department      8/2/2018 12:30 PM Katelyn Castillo, SLP [facility] Speech and Language        Today's Diagnoses     Dysarthria    -  1       Follow-ups after your visit        Your next 10 appointments already scheduled     Dec 20, 2018  1:30 PM CST   PFT VISIT with  PFL B   [facility] Pulmonary Function Testing ([facility])    [address] [place] [id number] [phone]            Dec 20, 2018  2:00 PM CST   (Arrive by 1:45 PM)   Return ALS/Motor Neuron with Maxime Ny MD   [facility] Neurology ([facility])    [address] [place] [address] [id number] [phone]              Future tests that were ordered for you today     Open Future Orders        Priority Expected Expires Ordered    PHYSICAL THERAPY REFERRAL Routine 8/2/2018 8/1/2019 8/1/2018    OCCUPATIONAL THERAPY REFERRAL Routine 8/2/2018 8/1/2019 8/1/2018    SPEECH THERAPY REFERRAL Routine 8/7/2018 8/1/2019 8/1/2018            Who to contact     Please call your clinic at [phone] to:    Ask questions about your health    Make or cancel appointments    Discuss your medicines    Learn about your test results    Speak to your doctor            Additional Information About Your Visit        MyChart Information     [employer] is an electronic gateway that provides easy, online access to your medical records. With [employer], you can request a clinic appointment, read your test results, renew a prescription or communicate with your care team.     To sign up for [employer] visit the website at [email or web address]   You will be asked to enter the access code listed below, as well as some personal information. Please follow the directions to create your username and  password.     Your access code is: [email or web address]  Expires: 10/17/2018  6:30 AM     Your access code will  in 90 days. If you need help or a new code, please contact your [facility] or call [phone] for assistance.        Care EveryWhere ID     This is your Care EveryWhere ID. This could be used by other organizations to access your [facility] medical records  [id number]         Blood Pressure from Last 3 Encounters:   18 136/78   18 159/79   07 146/90    Weight from Last 3 Encounters:   18 50.2 kg (110 lb 9.6 oz)   18 48.7 kg (107 lb 6.4 oz)   07 60.3 kg (133 lb)              Today, you had the following     No orders found for display       Primary Care Provider Office Phone # Fax #    Katelyn Anne Lee [phone]        [facility] [address] [place] [address]        Equal Access to Services     [facility]: Hadii aad ku hadasho Soomaali, waaxda luqadaha, qaybta kaalmada adeegyada, waxay idiin hayeve lee . So [facility] [phone].    ATENCIÓN: Si habla español, tiene a roberts disposición servicios gratuitos de asistencia lingüística. Llame al [phone].    We comply with applicable federal civil rights laws and [place] laws. We do not discriminate on the basis of race, color, national origin, age, disability, sex, sexual orientation, or gender identity.            Thank you!     Thank you for choosing [facility] SPEECH AND LANGUAGE  for your care. Our goal is always to provide you with excellent care. Hearing back from our patients is one way we can continue to improve our services. Please take a few minutes to complete the written survey that you may receive in the mail after your visit with us. Thank you!             Your Updated Medication List - Protect others around you: Learn how to safely use, store and throw away your medicines at www.disposemymeds.org.          This list is accurate as of  8/2/18  2:53 PM.  Always use your most recent med list.                   Brand Name Dispense Instructions for use Diagnosis    amLODIPine 10 MG tablet    NORVASC     Take 10 mg by mouth daily        glycopyrrolate 1 MG tablet    ROBINUL    90 tablet    Take 1 tablet (1 mg) by mouth 3 times daily as needed May increase to 2 tabs if needed. Reduce dose if dryness becomes excessive.    Disturbance of salivary secretion       ibuprofen 200 MG tablet    ADVIL/MOTRIN     Take 800 mg by mouth every 6 hours as needed        lisinopril-hydrochlorothiazide 20-25 MG per tablet    PRINZIDE/ZESTORETIC     Take 20-25 tablets by mouth daily        potassium chloride SA 20 MEQ CR tablet    K-DUR/KLOR-CON M     Take 20 mEq by mouth daily        riluzole 50 MG tablet    RILUTEK    60 tablet    Take 1 tablet (50 mg) by mouth every 12 hours    ALS (amyotrophic lateral sclerosis) (H)       traMADol 50 MG tablet    ULTRAM     Take by mouth daily as needed

## 2018-08-02 NOTE — MR AVS SNAPSHOT
After Visit Summary   8/2/2018    Gina Stiles    MRN: [id number]           Patient Information     Date Of Birth          1950        Visit Information        Provider Department      8/2/2018 12:30 PM Sabina Munoz PT [facility] Physical Therapy and Rehab        Today's Diagnoses     Impaired mobility and ADLs    -  1    ALS (amyotrophic lateral sclerosis) (H)           Follow-ups after your visit        Your next 10 appointments already scheduled     Dec 20, 2018  1:30 PM CST   PFT VISIT with  PFL B   [facility] Pulmonary Function Testing ([facility])    [address] [place] [id number] [phone]            Dec 20, 2018  2:00 PM CST   (Arrive by 1:45 PM)   Return ALS/Motor Neuron with Maxime Ny MD   [facility] Neurology ([facility])    [address] [place] [id number] [phone]              Future tests that were ordered for you today     Open Future Orders        Priority Expected Expires Ordered    PHYSICAL THERAPY REFERRAL Routine 8/2/2018 8/1/2019 8/1/2018    OCCUPATIONAL THERAPY REFERRAL Routine 8/2/2018 8/1/2019 8/1/2018    SPEECH THERAPY REFERRAL Routine 8/7/2018 8/1/2019 8/1/2018            Who to contact     Please call your clinic at [phone] to:    Ask questions about your health    Make or cancel appointments    Discuss your medicines    Learn about your test results    Speak to your doctor            Additional Information About Your Visit        MyChart Information     [employer] is an electronic gateway that provides easy, online access to your medical records. With [employer], you can request a clinic appointment, read your test results, renew a prescription or communicate with your care team.     To sign up for [employer] visit the website at [email or web address]   You will be asked to enter the access code listed below, as well as some personal  information. Please follow the directions to create your username and password.     Your access code is: [email or web address]  Expires: 10/17/2018  6:30 AM     Your access code will  in 90 days. If you need help or a new code, please contact your [facility] or call [phone] for assistance.        Care EveryWhere ID     This is your Care EveryWhere ID. This could be used by other organizations to access your [facility] medical records  [id number]         Blood Pressure from Last 3 Encounters:   18 136/78   18 159/79   07 146/90    Weight from Last 3 Encounters:   18 50.2 kg (110 lb 9.6 oz)   18 48.7 kg (107 lb 6.4 oz)   07 60.3 kg (133 lb)              Today, you had the following     No orders found for display       Primary Care Provider Office Phone # Fax #    Katelyn Anne Lee [phone] [address] [place] [address]        Equal Access to Services     [facility]: Hadii aad ku hadasho Soheydi, waaxda luqadaha, qaybta kaalmada kayleen, kim lee . So [facility] [phone].    ATENCIÓN: Si habla español, tiene a roberts disposición servicios gratuitos de asistencia lingüística. [facility] [phone].    We comply with applicable federal civil rights laws and [place] laws. We do not discriminate on the basis of race, color, national origin, age, disability, sex, sexual orientation, or gender identity.            Thank you!     Thank you for choosing [facility] PHYSICAL THERAPY AND REHAB  for your care. Our goal is always to provide you with excellent care. Hearing back from our patients is one way we can continue to improve our services. Please take a few minutes to complete the written survey that you may receive in the mail after your visit with us. Thank you!             Your Updated Medication List - Protect others around you: Learn how to safely use, store and throw away your  medicines at www.disposemymeds.org.          This list is accurate as of 8/2/18  3:19 PM.  Always use your most recent med list.                   Brand Name Dispense Instructions for use Diagnosis    amLODIPine 10 MG tablet    NORVASC     Take 10 mg by mouth daily        glycopyrrolate 1 MG tablet    ROBINUL    90 tablet    Take 1 tablet (1 mg) by mouth 3 times daily as needed May increase to 2 tabs if needed. Reduce dose if dryness becomes excessive.    Disturbance of salivary secretion       ibuprofen 200 MG tablet    ADVIL/MOTRIN     Take 800 mg by mouth every 6 hours as needed        lisinopril-hydrochlorothiazide 20-25 MG per tablet    PRINZIDE/ZESTORETIC     Take 20-25 tablets by mouth daily        potassium chloride SA 20 MEQ CR tablet    K-DUR/KLOR-CON M     Take 20 mEq by mouth daily        riluzole 50 MG tablet    RILUTEK    60 tablet    Take 1 tablet (50 mg) by mouth every 12 hours    ALS (amyotrophic lateral sclerosis) (H)       traMADol 50 MG tablet    ULTRAM     Take by mouth daily as needed

## 2018-08-03 ENCOUNTER — TELEPHONE (OUTPATIENT)
Dept: NEUROLOGY | Facility: CLINIC | Age: 68
End: 2018-08-03

## 2018-08-03 ENCOUNTER — DOCUMENTATION ONLY (OUTPATIENT)
Dept: NUTRITION | Facility: CLINIC | Age: 68
End: 2018-08-03

## 2018-08-03 NOTE — PROGRESS NOTES
Service Date: 2018      Karin Bray PA-C   [address] [address]      RE: Gina Stiles   MRN: [id number]   : 1950      Dear Ms. Bray:       I saw Gina Stiles in followup at the [facility] where I have seen her previously for diagnosis of ALS.  Ms. Stiles reports she is doing generally well.  She underwent gastrostomy and is using the tube with some difficulty tolerating Jevity.  For that reason, it is medically necessary that she switch to a different formula as documented elsewhere in my note today.  I discussed this with Gina and her daughter today.  She has some redness around the site and there may be a small amount of whitish exudate as well.  They have purchased an antifungal cream and will begin to use that.  Finally, with regard to her tube today, she noted that it seemed to be partly out when I met with her.  We will discuss arrangements to have this replaced and ultimately she would benefit from having a DANYEL-KEY tube.      With regard to her ALS, she is using noninvasive ventilation several hours a day, but has difficulty sleeping with it at night.  She denies symptoms of sleep disordered breathing, however, and has no morning headaches.  She does have some intermittent neck discomfort which is likely muscular.  We discussed using massage and a neck brace to assist with this.  She has had no falls and is living with her daughter.  She has chosen not to use riluzole.      Examination demonstrates an alert and cooperative woman seated in a wheelchair.  She has marked dysarthria but speech is about 60% comprehensible.  There is atrophy and markedly reduced strength of the tongue.  There is equivocal weakness of neck flexion and extension and equivocal weakness of pterygoid strength.  She has good strength proximally in upper and lower limbs with mild to moderate distal  weakness of hands and feet and an incipient foot drop on the right in particular.  She does walk independently, however, limited only by dyspnea with substantial exertion.  Her pulmonary function studies again demonstrate a good forced vital capacity, but reduced MIP and MEP.  She is using breath stacking in addition to NIV. She will also benefit from use of suction, which is medically indicated to assist with management of secretions.     Derrick met with other members our multidisciplinary care team today.  She will have her tube replaced today if possible.  She will return in 3 months.  We did discuss ALS research as well and because of her distance from ALS centers, she felt this would not be feasible currently.      Sincerely,       MD PEDRO Grant MD             D: 2018   T: 2018   MT: ELLIOT      Name:     DERRICK NAVA   MRN:      [id number]        Account:      [id number]   :      1950           Service Date: 2018      Document: [id number]

## 2018-08-03 NOTE — PROGRESS NOTES
CLINICAL NUTRITION SERVICES - REASSESSMENT NOTE   EVALUATION OF PREVIOUS PLAN OF CARE:      New order placed 7/26:  Formula: Compleat Organic blends  Volume: 4 pouches/day (10oz pouch)  Provisions: 1200mL free water, 1520kcal (32kcal/kg), 56g protein (1.2g pro/kg), 18g fiber daily      New order faxed:  [facility]: [phone]      RD called patient and her daughter, Kiera to inform them of the new order.    Advised pt to transition to new formula slowly.        Per [facility]: More documentation is needed to justify coverage of specialty formula.       Pt seen today in multidisciplinary ALS clinic for re-assessment.   Referring Physician: Yanely  Current diet: NPO  PEG Tube: Yes     Monitoring from previous assessment:   -G tube- taking 100% estimated nutrition needs via FT.    1. Patient has been complaining of nausea, vomiting with Jevity formula.  When she infuses alternative formulas, she denies symptoms.    2. She would like to try a natural formula made with whole ingredients made without HFCS, other than Jevity and Ensure.  This may help reduce reactions and intolerances.       -Fluid/beverage intake - NPO     -Weight trends - stable       Wt Readings from Last 6 Encounters:   08/02/18 50.2 kg (110 lb 9.6 oz)   04/05/18 48.7 kg (107 lb 6.4 oz)   09/21/07 60.3 kg (133 lb)   09/18/07 61.2 kg (135 lb)   08/14/07 61.7 kg (136 lb 1.9 oz)   06/18/07 62.6 kg (138 lb 1.3 oz)         Previous Goals:   1.  Aim for 5-6 small frequent meals - goal not met  2.  Aim for 1900kcal (per FRS) and 60g protein/day - goal met  3. Weight maintenance - goal met  Evaluation: Not met   Previous Nutrition Diagnosis:   Inadequate oral intake related to dysphagia, meal time fatigue as evidenced by 20% wt loss x past 6 months  Evaluation: Completed      NEW FINDINGS:   Nausea, vomiting, fatigue   CURRENT NUTRITION DIAGNOSIS   Inadequate oral intake related to dysphagia as evidenced by pt dependent on FT to meet 100% of her nutrition  needs.    INTERVENTIONS   Recommendations / Nutrition Prescription  Formula: Compleat Organic Blends - 4 pouches/day   Recommendations for formula transition:  Day 1: 2 feedings of original feeding routine (Ensure/Premier protein or Jevity), 1 pouch of Compleat  Day 2: 1 feeding of original feeding routine (Ensure/Premier protein or Jevity), 2 pouches of Compleat  Day 3:  1 feeding of original feeding routine (Ensure or Jevity), 3 pouches of Compleat  Day 4: 1 pouch of Compleat QID     Formula contains 300mL free water in each pouch (1200mL water, ~5 cups water).     Interventions:   -Advised pt to take an additional 3 cups water daily via flushes for tube patency and hydration.  -Discussed potential thicker viscosity of new formula.  Discussed ability to add water to formula to thin it out.   -Advised pt to transition very slowly as above schedule.  Advised pt to infuse via gravity feedings in 45-60 min (slower than current regimen of 30 minutes).   -Discussed fiber content of new formula. Current formula will provide a total of 24g of fiber which is almost 15 grams more that what she was receiving.     -Discussed potential gas/bloating, nausea that may be associated with new formula, especially if infused too quickly.      Advised pt and daughter to call RD with questions.   - EN intake/tolerance  - Weight trends      Loren Magaña, JOHN, LD

## 2018-08-20 ENCOUNTER — TELEPHONE (OUTPATIENT)
Dept: NEUROLOGY | Facility: CLINIC | Age: 68
End: 2018-08-20

## 2018-08-20 NOTE — TELEPHONE ENCOUNTER
[facility] Call Center    Phone Message    May a detailed message be left on voicemail: no    Reason for Call: Other: Leslie from [facility] called to request an order for an oral suction machine for the Pt. Please fax the order to [phone], or if anyone has questions please call her at [phone].     Action Taken: Message routed to:  [facility]

## 2018-08-21 DIAGNOSIS — G12.21 ALS (AMYOTROPHIC LATERAL SCLEROSIS) (H): Primary | ICD-10-CM

## 2018-09-02 DIAGNOSIS — K11.7 DISTURBANCE OF SALIVARY SECRETION: ICD-10-CM

## 2018-09-04 RX ORDER — GLYCOPYRROLATE 1 MG/1
TABLET ORAL
Qty: 90 TABLET | Refills: 3 | Status: SHIPPED | OUTPATIENT
Start: 2018-09-04 | End: 2018-10-31

## 2018-09-07 LAB
EXPTIME-PRE: 7.42 SEC
FEF2575-%PRED-PRE: 128 %
FEF2575-PRE: 2.31 L/SEC
FEF2575-PRED: 1.8 L/SEC
FEFMAX-%PRED-PRE: 71 %
FEFMAX-PRE: 3.73 L/SEC
FEFMAX-PRED: 5.24 L/SEC
FEV1-%PRED-PRE: 85 %
FEV1-PRE: 1.69 L
FEV1FEV6-PRE: 88 %
FEV1FEV6-PRED: 80 %
FEV1FVC-PRE: 88 %
FEV1FVC-PRED: 76 %
FIFMAX-PRE: 1.75 L/SEC
FVC-%PRED-PRE: 76 %
FVC-PRE: 1.93 L
FVC-PRED: 2.53 L
MEP-PRE: 35 CMH2O
MIP-PRE: -24 CMH2O

## 2018-10-31 DIAGNOSIS — K11.7 DISTURBANCE OF SALIVARY SECRETION: ICD-10-CM

## 2018-11-01 RX ORDER — GLYCOPYRROLATE 1 MG/1
TABLET ORAL
Qty: 90 TABLET | Refills: 3 | Status: SHIPPED | OUTPATIENT
Start: 2018-11-01 | End: 2019-01-15

## 2018-11-30 DIAGNOSIS — K11.7 SIALORRHEA: ICD-10-CM

## 2018-11-30 DIAGNOSIS — G12.21 ALS (AMYOTROPHIC LATERAL SCLEROSIS) (H): Primary | ICD-10-CM

## 2018-12-19 DIAGNOSIS — G12.21 ALS (AMYOTROPHIC LATERAL SCLEROSIS) (H): Primary | ICD-10-CM

## 2018-12-25 DIAGNOSIS — K11.7 DISTURBANCE OF SALIVARY SECRETION: ICD-10-CM

## 2018-12-27 RX ORDER — GLYCOPYRROLATE 1 MG/1
TABLET ORAL
Qty: 90 TABLET | Refills: 3 | OUTPATIENT
Start: 2018-12-27

## 2019-01-15 ENCOUNTER — TELEPHONE (OUTPATIENT)
Dept: NUTRITION | Facility: CLINIC | Age: 69
End: 2019-01-15

## 2019-01-15 NOTE — TELEPHONE ENCOUNTER
Nutrtiion Services:     Called patient's daughter, Kiera today per request of Dr. Ny regarding weight loss and review of EN regimen.     Kiera reports that her mom has been taking 3-4 pouches of Compleat Organic Blends formula pouches/day which contains ~1140-1520kcal.    Kiera reports that she has also been giving her mom 1/2 cup bone broth and other juices TID to help thin out the formula. She will on occasion giver her mom 1/2 bottle of Ensure and/or baby to get more variety.  These products are typically in addition to the 3 pouches of formula.    Intervention:   Reviewed calorie needs with Kiera.  Discussed that if her mom only takes 3 pouches of formula, she would need an additional 400kcal /day to prevent further weight loss.   Advised Kiera to ensure that her mother takes at least 4 pouches of Compleat formula before she gives her the other food sources.  This will ensure she is receiving 100% of macro and micro nutrients.      If she is able to consume 4 pouches/day, then she can provide other sources of nutrition to supplement for variety.     Weight trends: per MD, pt is down to 102 lbs.  This is an 8 lb wt loss (8%) x past 4 months.   Wt Readings from Last 6 Encounters:   08/02/18 50.2 kg (110 lb 9.6 oz)   04/05/18 48.7 kg (107 lb 6.4 oz)   09/21/07 60.3 kg (133 lb)   09/18/07 61.2 kg (135 lb)   08/14/07 61.7 kg (136 lb 1.9 oz)   06/18/07 62.6 kg (138 lb 1.3 oz)     RD will follow-up with patient on 2/7 at next clinic visit.    Loren Magaña, JOHN, LD

## 2019-01-17 DIAGNOSIS — G12.21 ALS (AMYOTROPHIC LATERAL SCLEROSIS) (H): Primary | ICD-10-CM

## 2019-02-06 DIAGNOSIS — G12.21 ALS (AMYOTROPHIC LATERAL SCLEROSIS) (H): Primary | ICD-10-CM

## 2019-02-06 DIAGNOSIS — K11.7 SIALORRHEA: Primary | ICD-10-CM

## 2019-02-11 ENCOUNTER — TELEPHONE (OUTPATIENT)
Dept: NEUROLOGY | Facility: CLINIC | Age: 69
End: 2019-02-11

## 2019-02-11 DIAGNOSIS — G12.21 ALS (AMYOTROPHIC LATERAL SCLEROSIS) (H): Primary | ICD-10-CM
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
## 2019-02-12 RX ORDER — AMITRIPTYLINE HYDROCHLORIDE 10 MG/1
TABLET ORAL
Qty: 60 TABLET | Refills: 3 | Status: SHIPPED | OUTPATIENT
Start: 2019-02-12

## 2019-02-12 NOTE — TELEPHONE ENCOUNTER
I had a long telephone conversation with Gina and Rubi. Gina awakens every 30 minutes from 10 PM to 2 AM, then is up for the rest of the night. She feels she is having difficulty breathing when she awakens. This has been going on for some time now. Last night she slept in the recliner and it was somewhat better. She had HOB elevated only modestly before and higher in the recliner. She still feels uncomfortable sleeping with the full face mask, and gets an air leak when she sleeps on her side - her preferred position. In addition she feels she has a dry roof of mouth but sialorrhea, and some thin mucus as well - all bothersome. She does use the humidifier. No headaches upon awakening. Sometimes has somnolence upon awakening. Taking robiul 2 mg TID but not before bed. RT from [facility] will come later this week or next.    Recommendations    Ideally a sleep study would be helpful. She will consider whether she is willing to proceed but for now    1. Sleep with HOB elevated as much as possible.Sleep on her side if possible.  2. Take 2 mg of robinul at bedtime.   3. I will start her on amitriptyline at HS to reduce saliva and possibly help with sleep with limited respiratory suppression (c/w atarax or a benzo). I will review with PharmD as well. No history of arrhythmia.  4. Ask RT to trial nasal pillows and consider adjustments of settings.  5. Consider sublingual atropine or botox at a later date.     Hospice input may be very helpful as well. They will see her tomorrow.    Addendum: reviewed with PharmD and again with Gina. We will start with amitriptyline.

## 2019-02-12 NOTE — TELEPHONE ENCOUNTER
I called but was unable to speak with the patient as she was in the bathroom. I spoke with her daughter. The patient awakens with dyspnea. I think she will need to sleep at a higher angle and try to find a way to utilize NIV at night. If her breathing is that tenuous at night it might not be prudent to treat that with anxiolytics. RT is coming this week as is hospice. I will call back when Gina is available.

## 2019-02-12 NOTE — TELEPHONE ENCOUNTER
"Kiera reports pt has experienced a couple incidents of \"sleep apnea\" where there have been a few seconds during sleep that appeared pt wasn't breathing. Patient is not wearing her BiPAP at night as she cannot tolerate. DME company has been out to trouble shoot settings and masks and patient continues to state she cannot tolerate the BiPAP. Daughter is requesting patient be prescribed a medication to help with sleep or a medication to lessen anxiety so pt will use her BiPAP. Daughter denies any other changes in symptoms except to say pt saw PCP yesterday for an ear infection. Daughter would like a follow-up appt, sometime after 1:00pm, if there are alternatives to help with breathing issues.  Daughter also indicated patient has Hospice Consult 2/13/2019.    "

## 2019-02-13 ENCOUNTER — MEDICAL CORRESPONDENCE (OUTPATIENT)
Dept: HEALTH INFORMATION MANAGEMENT | Facility: CLINIC | Age: 69
End: 2019-02-13

## 2019-02-14 DIAGNOSIS — K11.7 SIALORRHEA: Primary | ICD-10-CM

## 2019-02-14 RX ORDER — ATROPINE SULFATE 10 MG/ML
2 SOLUTION/ DROPS OPHTHALMIC ONCE
Status: ACTIVE | OUTPATIENT
Start: 2019-02-14

## 2019-02-14 RX ORDER — ATROPINE SULFATE 10 MG/ML
SOLUTION/ DROPS OPHTHALMIC
Qty: 12 ML | Refills: 3 | Status: SHIPPED | OUTPATIENT
Start: 2019-02-14

## 2019-02-19 ENCOUNTER — TELEPHONE (OUTPATIENT)
Dept: NEUROLOGY | Facility: CLINIC | Age: 69
End: 2019-02-19

## 2019-03-08 ENCOUNTER — DOCUMENTATION ONLY (OUTPATIENT)
Dept: OTHER | Facility: CLINIC | Age: 69
End: 2019-03-08

## 2019-04-25 ENCOUNTER — TELEPHONE (OUTPATIENT)
Dept: PHYSICAL MEDICINE AND REHAB | Facility: CLINIC | Age: 69
End: 2019-04-25
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
## 2019-05-03 ENCOUNTER — TELEPHONE (OUTPATIENT)
Dept: PHYSICAL MEDICINE AND REHAB | Facility: CLINIC | Age: 69
End: 2019-05-03

## 2019-05-03 NOTE — TELEPHONE ENCOUNTER
Called Pt's daughter Kiera on 4/29/2019 and today 5/3/2019 to discuss possible evaluation appointment with Dr. Candelaria PM&R.  Left phone numbers for myself and my colleague Bernadette Crabtree.  Awaiting response.

## 2019-05-08 ENCOUNTER — MEDICAL CORRESPONDENCE (OUTPATIENT)
Dept: HEALTH INFORMATION MANAGEMENT | Facility: CLINIC | Age: 69
End: 2019-05-08